# Patient Record
Sex: MALE | Race: BLACK OR AFRICAN AMERICAN | Employment: OTHER | URBAN - NONMETROPOLITAN AREA
[De-identification: names, ages, dates, MRNs, and addresses within clinical notes are randomized per-mention and may not be internally consistent; named-entity substitution may affect disease eponyms.]

---

## 2021-04-16 ENCOUNTER — TRANSCRIBE ORDER (OUTPATIENT)
Dept: REGISTRATION | Age: 61
End: 2021-04-16

## 2021-04-16 ENCOUNTER — HOSPITAL ENCOUNTER (OUTPATIENT)
Dept: LAB | Age: 61
Discharge: HOME | End: 2021-04-16
Payer: MEDICARE

## 2021-04-16 DIAGNOSIS — E11.8 TYPE II DIABETES MELLITUS WITH COMPLICATION (HCC): Primary | ICD-10-CM

## 2021-04-16 DIAGNOSIS — E11.8 TYPE II DIABETES MELLITUS WITH COMPLICATION (HCC): ICD-10-CM

## 2021-04-16 LAB
ALBUMIN SERPL-MCNC: 4.2 G/DL (ref 3.5–4.7)
ALBUMIN/GLOB SERPL: 1.2 {RATIO}
ALP SERPL-CCNC: 74 U/L (ref 38–126)
ALT SERPL-CCNC: 54 U/L (ref 3–72)
ANION GAP SERPL CALC-SCNC: 8 MMOL/L
AST SERPL W P-5'-P-CCNC: 29 U/L (ref 17–74)
BILIRUB SERPL-MCNC: 1 MG/DL (ref 0.2–1)
BUN SERPL-MCNC: 20 MG/DL (ref 9–21)
BUN/CREAT SERPL: 17
CA-I BLD-MCNC: 9.8 MG/DL (ref 8.5–10.5)
CHLORIDE SERPL-SCNC: 105 MMOL/L (ref 94–111)
CO2 SERPL-SCNC: 25 MMOL/L (ref 21–33)
CREAT SERPL-MCNC: 1.2 MG/DL (ref 0.8–1.5)
EST. AVERAGE GLUCOSE BLD GHB EST-MCNC: 163 MG/DL
GLOBULIN SER CALC-MCNC: 3.4 G/DL
GLUCOSE SERPL-MCNC: 128 MG/DL (ref 70–110)
HBA1C MFR BLD: 7.3 % (ref 4–5.6)
POTASSIUM SERPL-SCNC: 4.5 MMOL/L (ref 3.2–5.1)
PROT SERPL-MCNC: 7.6 G/DL (ref 6.1–8.4)
SODIUM SERPL-SCNC: 138 MMOL/L (ref 135–145)

## 2021-04-16 PROCEDURE — 83036 HEMOGLOBIN GLYCOSYLATED A1C: CPT

## 2021-04-16 PROCEDURE — 80053 COMPREHEN METABOLIC PANEL: CPT

## 2021-04-16 PROCEDURE — 36415 COLL VENOUS BLD VENIPUNCTURE: CPT

## 2022-03-08 ENCOUNTER — APPOINTMENT (OUTPATIENT)
Dept: CT IMAGING | Age: 62
End: 2022-03-08
Attending: EMERGENCY MEDICINE
Payer: MEDICARE

## 2022-03-08 ENCOUNTER — HOSPITAL ENCOUNTER (EMERGENCY)
Age: 62
Discharge: HOME | End: 2022-03-08
Attending: EMERGENCY MEDICINE
Payer: MEDICARE

## 2022-03-08 VITALS
WEIGHT: 160 LBS | RESPIRATION RATE: 14 BRPM | SYSTOLIC BLOOD PRESSURE: 145 MMHG | HEART RATE: 76 BPM | TEMPERATURE: 98.3 F | DIASTOLIC BLOOD PRESSURE: 78 MMHG | OXYGEN SATURATION: 98 % | BODY MASS INDEX: 25.71 KG/M2 | HEIGHT: 66 IN

## 2022-03-08 DIAGNOSIS — S01.01XA LACERATION OF SCALP, INITIAL ENCOUNTER: Primary | ICD-10-CM

## 2022-03-08 PROCEDURE — 70450 CT HEAD/BRAIN W/O DYE: CPT

## 2022-03-08 PROCEDURE — 90715 TDAP VACCINE 7 YRS/> IM: CPT | Performed by: EMERGENCY MEDICINE

## 2022-03-08 PROCEDURE — 99284 EMERGENCY DEPT VISIT MOD MDM: CPT

## 2022-03-08 PROCEDURE — 75810000293 HC SIMP/SUPERF WND  RPR

## 2022-03-08 PROCEDURE — 74011250636 HC RX REV CODE- 250/636: Performed by: EMERGENCY MEDICINE

## 2022-03-08 PROCEDURE — 90471 IMMUNIZATION ADMIN: CPT

## 2022-03-08 RX ADMIN — TETANUS TOXOID, REDUCED DIPHTHERIA TOXOID AND ACELLULAR PERTUSSIS VACCINE, ADSORBED 0.5 ML: 5; 2.5; 8; 8; 2.5 SUSPENSION INTRAMUSCULAR at 16:52

## 2022-03-08 NOTE — ED NOTES
Patients mom states that patient has been special needs since birth. He is non verbal with exception of yes/no.

## 2022-03-08 NOTE — ED TRIAGE NOTES
EMS received call for a pt who experienced a fall while in an adult day care setting. Pt. Did not want to come. PT. Is non verbal but per EMS seemed disgruntled to have to come. Worker from adult day care presents with pt. Until family can arrive.

## 2022-03-10 NOTE — ED PROVIDER NOTES
EMERGENCY DEPARTMENT HISTORY AND PHYSICAL EXAM      Date: 3/8/2022  Patient Name: Pedro Kimble    History of Presenting Illness     Chief Complaint   Patient presents with    Fall    Head Injury       History Provided By: Patient's Mother, EMS and Caregiver    HPI: Pedro Kimble, 59 y.o. male with a past medical history significant history of intellectual abilities, classified an old chart as MR, he has speech and multiple other impediments,  presents to the ED from a rehab and out day care place where he is reported to have fallen and injured his head. Mother at bedside however states patient had spasms of his back jerking and he hit the back of his head to the wall and fell. He has a cut back of head. There is no history of loss of consciousness. According to mother who can communicate with patient, patient is denying headache. Unable to get history from patient myself. There are no other complaints, changes, or physical findings at this time. PCP: COLTON Gr    No current facility-administered medications on file prior to encounter. No current outpatient medications on file prior to encounter. Past History     Past Medical History:  No past medical history on file. Past Surgical History:  No past surgical history on file. Family History:  No family history on file. Social History:  Social History     Tobacco Use    Smoking status: Not on file    Smokeless tobacco: Not on file   Substance Use Topics    Alcohol use: Not on file    Drug use: Not on file       Allergies:  No Known Allergies      Review of Systems     Review of Systems   Unable to perform ROS: Patient nonverbal       Physical Exam     Physical Exam  Vitals and nursing note reviewed. Constitutional:       General: He is not in acute distress. Appearance: He is well-developed. He is not diaphoretic. Comments: Appears in no acute distress. He is able to follow directions.    HENT:      Head: Normocephalic and atraumatic. No right periorbital erythema or left periorbital erythema. Jaw: No trismus. Comments: 4 cm laceration, superficial occipital area. Pain is controlled. Right Ear: External ear normal. No drainage or swelling. Tympanic membrane is not perforated, erythematous or bulging. Left Ear: External ear normal. No drainage or swelling. Tympanic membrane is not perforated, erythematous or bulging. Nose: Nose normal. No mucosal edema or rhinorrhea. Right Sinus: No maxillary sinus tenderness or frontal sinus tenderness. Left Sinus: No maxillary sinus tenderness or frontal sinus tenderness. Mouth/Throat:      Mouth: No oral lesions. Dentition: No dental abscesses. Pharynx: Uvula midline. No oropharyngeal exudate, posterior oropharyngeal erythema or uvula swelling. Tonsils: No tonsillar abscesses. Eyes:      General: No scleral icterus. Right eye: No discharge. Left eye: No discharge. Conjunctiva/sclera: Conjunctivae normal.   Cardiovascular:      Rate and Rhythm: Normal rate and regular rhythm. Heart sounds: Normal heart sounds. No murmur heard. No friction rub. No gallop. Pulmonary:      Effort: Pulmonary effort is normal. No tachypnea, accessory muscle usage or respiratory distress. Breath sounds: Normal breath sounds. No decreased breath sounds, wheezing, rhonchi or rales. Abdominal:      General: Bowel sounds are normal. There is no distension. Palpations: Abdomen is soft. Tenderness: There is no abdominal tenderness. Musculoskeletal:         General: No tenderness. Normal range of motion. Cervical back: Normal range of motion and neck supple. Lymphadenopathy:      Cervical: No cervical adenopathy. Skin:     General: Skin is warm and dry. Neurological:      Mental Status: He is alert and oriented to person, place, and time.    Psychiatric:         Judgment: Judgment normal. Lab and Diagnostic Study Results     Labs -   No results found for this or any previous visit (from the past 12 hour(s)). Radiologic Studies -   @lastxrresult@  CT Results  (Last 48 hours)               03/08/22 1450  CT HEAD WO CONT Final result    Impression:          1. No acute intra-axial or extra-axial hemorrhage. No evidence of mass effect or   midline shift. 2. Colpocephalic to the lateral ventricles with marked accompanying ventricular   dilatation and extreme focal atrophy of the parietal lobes. 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 Comparison with prior imaging is recommended if available.      > Suspect sequela of prior/distant toxic/metabolic insult. Narrative:  EXAM: CT head       INDICATION: Fall with head injury. COMPARISON: None. TECHNIQUE: Axial CT imaging of the head was performed without intravenous   contrast. Standard multiplanar coronal and sagittal reformatted images were   obtained and are included in interpretation. One or more dose reduction techniques were used on this CT: automated exposure   control, adjustment of the mAs and/or kVp according to patient size, and   iterative reconstruction techniques. The specific techniques used on this CT   exam have been documented in the patient's electronic medical record. Digital   Imaging and Communications in Medicine (DICOM) format image data are available   to nonaffiliated external healthcare facilities or entities on a secure, media   free, reciprocally searchable basis with patient authorization for at least a   12-month period after this study. _______________       FINDINGS:       BRAIN AND POSTERIOR FOSSA: Marked enlargement of the posterior horns of the   lateral ventricles noted bilaterally. Normal configuration to the anterior   components of the lateral ventricles, third ventricle, and fourth ventricle. No   intra-axial hemorrhage.  No evidence of mass effect or midline shift. Subcortical   and periventricular white matter low-attenuation noted surrounding the areas of   ventricular enlargement with accompanying faint intracranial calcifications. Marked atrophy of the parietal lobes. EXTRA-AXIAL SPACES AND MENINGES: There are no abnormal extra-axial fluid   collections. CALVARIUM: Intact. SINUSES: Clear. OTHER: None.       _______________               CXR Results  (Last 48 hours)    None            Medical Decision Making   - I am the first provider for this patient. - I reviewed the vital signs, available nursing notes, past medical history, past surgical history, family history and social history. - Initial assessment performed. The patients presenting problems have been discussed, and they are in agreement with the care plan formulated and outlined with them. I have encouraged them to ask questions as they arise throughout their visit. Vital Signs-Reviewed the patient's vital signs. No data found. Records Reviewed: Nursing Notes and Old Medical Records    The patient presents with fall with head injury. ED Course:          Provider Notes (Medical Decision Making):         Procedures   Medical Decision Makingedical Decision Making  Performed by: Amy Don MD  PROCEDURES:  Wound Repair    Date/Time: 3/8/2022 5:15 PM  Performed by: attendingPreparation: skin prepped with Shur-Clens and sterile field established  Location details: scalp  Wound length:2.6 - 7.5 cm  Irrigation solution: tap water  Irrigation method: tap  Debridement: none  Skin closure: staples  Number of sutures: 3  Approximation: close  Patient tolerance: patient tolerated the procedure well with no immediate complications  My total time at bedside, performing this procedure was 1-15 minutes.            Disposition   Disposition: Condition stable  DC-The patient and mother was given verbal closed head injury and wound care instructions    Diagnosis:   1. Laceration of scalp, initial encounter          Disposition:     Follow-up Information     Follow up With Specialties Details Why Contact Info    Alyce Greer [place] Physician Assistant  For suture removal [address] [place]  [phone]      [facility] EMERGENCY DEPT Emergency Medicine  For suture removal [address] [facility]  [phone]          There are no discharge medications for this patient. DISCHARGE PLAN:  1. Cannot display discharge medications since this patient is not currently admitted. 2.   Follow-up Information     Follow up With Specialties Details Why Contact Info    Alyce Greer [place] Physician Assistant  For suture removal [address] [place]  [phone]      [facility] EMERGENCY DEPT Emergency Medicine  For suture removal [place] [id number] [phone]        3. Return to ED if worse   4. There are no discharge medications for this patient. Diagnosis     Clinical Impression:   1. Laceration of scalp, initial encounter        Attestations:    Estuardo Hernadez MD    Please note that this dictation was completed with [employer], the [employer] voice recognition software. Quite often unanticipated grammatical, syntax, homophones, and other interpretive errors are inadvertently transcribed by the computer software. Please disregard these errors. Please excuse any errors that have escaped final proofreading. Thank you.

## 2023-04-19 ENCOUNTER — OFFICE VISIT (OUTPATIENT)
Facility: CLINIC | Age: 63
End: 2023-04-19
Payer: MEDICARE

## 2023-04-19 VITALS
OXYGEN SATURATION: 99 % | RESPIRATION RATE: 18 BRPM | HEART RATE: 76 BPM | HEIGHT: 66 IN | BODY MASS INDEX: 24.88 KG/M2 | SYSTOLIC BLOOD PRESSURE: 107 MMHG | DIASTOLIC BLOOD PRESSURE: 72 MMHG | TEMPERATURE: 97.5 F | WEIGHT: 154.8 LBS

## 2023-04-19 DIAGNOSIS — F79 MENTAL DISABILITY: ICD-10-CM

## 2023-04-19 DIAGNOSIS — M62.838 SPASM OF MUSCLE: ICD-10-CM

## 2023-04-19 DIAGNOSIS — Z12.5 ENCOUNTER FOR PROSTATE CANCER SCREENING: ICD-10-CM

## 2023-04-19 DIAGNOSIS — E11.65 TYPE 2 DIABETES MELLITUS WITH HYPERGLYCEMIA, WITHOUT LONG-TERM CURRENT USE OF INSULIN (HCC): Primary | ICD-10-CM

## 2023-04-19 DIAGNOSIS — H54.8 LEGALLY BLIND: ICD-10-CM

## 2023-04-19 LAB — HBA1C MFR BLD: 11.1 %

## 2023-04-19 PROCEDURE — G8427 DOCREV CUR MEDS BY ELIG CLIN: HCPCS | Performed by: STUDENT IN AN ORGANIZED HEALTH CARE EDUCATION/TRAINING PROGRAM

## 2023-04-19 PROCEDURE — 3017F COLORECTAL CA SCREEN DOC REV: CPT | Performed by: STUDENT IN AN ORGANIZED HEALTH CARE EDUCATION/TRAINING PROGRAM

## 2023-04-19 PROCEDURE — 3046F HEMOGLOBIN A1C LEVEL >9.0%: CPT | Performed by: STUDENT IN AN ORGANIZED HEALTH CARE EDUCATION/TRAINING PROGRAM

## 2023-04-19 PROCEDURE — 1036F TOBACCO NON-USER: CPT | Performed by: STUDENT IN AN ORGANIZED HEALTH CARE EDUCATION/TRAINING PROGRAM

## 2023-04-19 PROCEDURE — G8420 CALC BMI NORM PARAMETERS: HCPCS | Performed by: STUDENT IN AN ORGANIZED HEALTH CARE EDUCATION/TRAINING PROGRAM

## 2023-04-19 PROCEDURE — 2022F DILAT RTA XM EVC RTNOPTHY: CPT | Performed by: STUDENT IN AN ORGANIZED HEALTH CARE EDUCATION/TRAINING PROGRAM

## 2023-04-19 PROCEDURE — 83036 HEMOGLOBIN GLYCOSYLATED A1C: CPT | Performed by: STUDENT IN AN ORGANIZED HEALTH CARE EDUCATION/TRAINING PROGRAM

## 2023-04-19 PROCEDURE — 99204 OFFICE O/P NEW MOD 45 MIN: CPT | Performed by: STUDENT IN AN ORGANIZED HEALTH CARE EDUCATION/TRAINING PROGRAM

## 2023-04-19 RX ORDER — ATORVASTATIN CALCIUM 20 MG/1
20 TABLET, FILM COATED ORAL NIGHTLY
COMMUNITY
Start: 2023-03-28

## 2023-04-19 RX ORDER — ASPIRIN 81 MG/1
81 TABLET ORAL DAILY
COMMUNITY

## 2023-04-19 RX ORDER — GLIPIZIDE 10 MG/1
10 TABLET, FILM COATED, EXTENDED RELEASE ORAL DAILY
Qty: 90 TABLET | Refills: 1 | Status: SHIPPED | OUTPATIENT
Start: 2023-04-19

## 2023-04-19 RX ORDER — GLIPIZIDE 5 MG/1
5 TABLET, FILM COATED, EXTENDED RELEASE ORAL DAILY
COMMUNITY
Start: 2023-03-28 | End: 2023-04-19 | Stop reason: SDUPTHER

## 2023-04-19 RX ORDER — TIZANIDINE 2 MG/1
TABLET ORAL
COMMUNITY
Start: 2023-03-22

## 2023-04-19 RX ORDER — BLOOD SUGAR DIAGNOSTIC
STRIP MISCELLANEOUS
COMMUNITY
Start: 2023-03-22

## 2023-04-19 SDOH — ECONOMIC STABILITY: HOUSING INSECURITY
IN THE LAST 12 MONTHS, WAS THERE A TIME WHEN YOU DID NOT HAVE A STEADY PLACE TO SLEEP OR SLEPT IN A SHELTER (INCLUDING NOW)?: NO

## 2023-04-19 SDOH — ECONOMIC STABILITY: FOOD INSECURITY: WITHIN THE PAST 12 MONTHS, YOU WORRIED THAT YOUR FOOD WOULD RUN OUT BEFORE YOU GOT MONEY TO BUY MORE.: NEVER TRUE

## 2023-04-19 SDOH — ECONOMIC STABILITY: INCOME INSECURITY: HOW HARD IS IT FOR YOU TO PAY FOR THE VERY BASICS LIKE FOOD, HOUSING, MEDICAL CARE, AND HEATING?: SOMEWHAT HARD

## 2023-04-19 SDOH — ECONOMIC STABILITY: FOOD INSECURITY: WITHIN THE PAST 12 MONTHS, THE FOOD YOU BOUGHT JUST DIDN'T LAST AND YOU DIDN'T HAVE MONEY TO GET MORE.: SOMETIMES TRUE

## 2023-04-19 ASSESSMENT — PATIENT HEALTH QUESTIONNAIRE - PHQ9: DEPRESSION UNABLE TO ASSESS: FUNCTIONAL CAPACITY MOTIVATION LIMITS ACCURACY

## 2023-04-19 NOTE — PROGRESS NOTES
Ayla Lewis presents today for   Chief Complaint   Patient presents with    New Patient     Here to establish care       Is someone accompanying this pt? yes    Is the patient using any DME equipment during OV? no    Health Maintenance reviewed and discussed and ordered per Provider. Health Maintenance Due   Topic Date Due    Lipids  Never done    Depression Screen  Never done    HIV screen  Never done    Hepatitis C screen  Never done    Colorectal Cancer Screen  Never done    Shingles vaccine (1 of 2) Never done    A1C test (Diabetic or Prediabetic)  07/16/2021    Annual Wellness Visit (AWV)  Never done    COVID-19 Vaccine (3 - Booster for [employer] series) 09/02/2022   . Coordination of Care:  1. \"Have you been to the ER, urgent care clinic since your last visit? Hospitalized since your last visit? \" No    2. \"Have you seen or consulted any other health care providers outside of the [address] [place] [address] since your last visit? \" No    3. For patients aged 39-70: Has the patient had a colonoscopy? No- maybe 5 years ago, doesn't remember when, where or who    If the patient is female:    4. For patients aged 41-77: Has the patient had a mammogram within the past 2 years? N/A based on age/sex    5. For patients aged 21-65: Has the patient had a pap smear? N/A based on age/sex    Fingerstick for HBA1C done in left hand first finger by Isaias Lora LPN per order of Surekha Ortiz MD after cleaning area with alcohol wipe. Patient tolerated procedure well.

## 2023-04-19 NOTE — PROGRESS NOTES
NEW PATIENT    History of Present Illness    Chief Complaint   Patient presents with    New Patient     Here to establish care       Ayla Lewis is a 57 y.o. male who presents today for establishment of care. Histories reviewed in detail and are outlined below. Patient has intellectual disability. Mother is primary caretaker, she provides most history. Patient is able to answer basic yes/no questions. Diabetes  Hemoglobin A1C   Date Value Ref Range Status   04/16/2021 7.3 (H) 4.0 - 5.6 % Final     Comment:     NEW METHOD  PLEASE NOTE NEW REFERENCE RANGE  (NOTE)  HbA1C Interpretive Ranges  <5.7              Normal  5.7 - 6.4         Consider Prediabetes  >6.5              Consider Diabetes       Hemoglobin A1C, POC   Date Value Ref Range Status   04/19/2023 46.8 % Final   Complications: None known  Home fasting blood sugars: variable- 200-400  No hypoglycemia or hypoglycemic sxs  Compliant with medications and tolerating well. Weight is stable. Diet is low carb, low sugar. Minimal exercise. Yearly ophthalmology visit: yes - Dr. Gavi Nguyen- patient is legally blind since childhood. Sees podiatry: no- Checks feet regularly, no open wounds or issues. On statin and tolerating well: Yes  ROS:  No changes in vision, chest pain, no SOB, no leg swelling, no polyuria, no numbness/tingling or sensation change    Takes Tizanidine PRN for muscle spasms in back    Never had crc screening    Past Medical History  Past Medical History:   Diagnosis Date    Type 2 diabetes mellitus without complication ([facility] [address]         Surgical History  History reviewed. No pertinent surgical history.      Current Medications  Current Outpatient Medications   Medication Sig    atorvastatin (LIPITOR) 20 MG tablet Take 1 tablet by mouth nightly    ONETOUCH ULTRA strip USE 1 STRIP TO CHECK GLUCOSE ONCE DAILY    tiZANidine (ZANAFLEX) 2 MG tablet TAKE 2 TABLETS BY MOUTH TWICE DAILY AS NEEDED    aspirin 81 MG EC tablet Take 1 tablet by mouth daily

## 2023-05-25 ENCOUNTER — HOSPITAL ENCOUNTER (OUTPATIENT)
Age: 63
Discharge: HOME | End: 2023-05-25
Payer: MEDICARE

## 2023-05-25 DIAGNOSIS — Z12.5 ENCOUNTER FOR PROSTATE CANCER SCREENING: ICD-10-CM

## 2023-05-25 DIAGNOSIS — E11.65 TYPE 2 DIABETES MELLITUS WITH HYPERGLYCEMIA, WITHOUT LONG-TERM CURRENT USE OF INSULIN (HCC): ICD-10-CM

## 2023-05-25 LAB
ALBUMIN SERPL-MCNC: 3.8 G/DL (ref 3.4–5)
ALBUMIN/GLOB SERPL: 1 (ref 0.8–1.7)
ALP SERPL-CCNC: 77 U/L (ref 45–117)
ALT SERPL-CCNC: 36 U/L (ref 16–61)
ANION GAP SERPL CALC-SCNC: 4 MMOL/L (ref 3–18)
AST SERPL W P-5'-P-CCNC: 20 U/L (ref 10–38)
BASOPHILS # BLD: 0.1 K/UL (ref 0–0.1)
BASOPHILS NFR BLD: 1 % (ref 0–2)
BILIRUB SERPL-MCNC: 0.5 MG/DL (ref 0.2–1)
BUN SERPL-MCNC: 14 MG/DL (ref 7–18)
BUN/CREAT SERPL: 12 (ref 12–20)
CA-I BLD-MCNC: 10 MG/DL (ref 8.5–10.1)
CHLORIDE SERPL-SCNC: 106 MMOL/L (ref 100–111)
CO2 SERPL-SCNC: 27 MMOL/L (ref 21–32)
CREAT SERPL-MCNC: 1.13 MG/DL (ref 0.6–1.3)
DIFFERENTIAL METHOD BLD: ABNORMAL
EOSINOPHIL # BLD: 0.3 K/UL (ref 0–0.4)
EOSINOPHIL NFR BLD: 6 % (ref 0–5)
ERYTHROCYTE [DISTWIDTH] IN BLOOD BY AUTOMATED COUNT: 13.2 % (ref 11.6–14.5)
GLOBULIN SER CALC-MCNC: 3.7 G/DL (ref 2–4)
GLUCOSE SERPL-MCNC: 216 MG/DL (ref 74–99)
HCT VFR BLD AUTO: 40.2 % (ref 36–48)
HGB BLD-MCNC: 12.8 G/DL (ref 13–16)
IMM GRANULOCYTES # BLD AUTO: 0 K/UL (ref 0–0.04)
IMM GRANULOCYTES NFR BLD AUTO: 0 % (ref 0–0.5)
LYMPHOCYTES # BLD: 2.2 K/UL (ref 0.9–3.6)
LYMPHOCYTES NFR BLD: 41 % (ref 21–52)
MCH RBC QN AUTO: 27.2 PG (ref 24–34)
MCHC RBC AUTO-ENTMCNC: 31.8 G/DL (ref 31–37)
MCV RBC AUTO: 85.5 FL (ref 78–100)
MONOCYTES # BLD: 0.6 K/UL (ref 0.05–1.2)
MONOCYTES NFR BLD: 11 % (ref 3–10)
NEUTS SEG # BLD: 2.2 K/UL (ref 1.8–8)
NEUTS SEG NFR BLD: 41 % (ref 40–73)
NRBC # BLD: 0 K/UL (ref 0–0.01)
NRBC BLD-RTO: 0 PER 100 WBC
PLATELET # BLD AUTO: 205 K/UL (ref 135–420)
PMV BLD AUTO: 10.8 FL (ref 9.2–11.8)
POTASSIUM SERPL-SCNC: 4.4 MMOL/L (ref 3.5–5.5)
PROT SERPL-MCNC: 7.5 G/DL (ref 6.4–8.2)
RBC # BLD AUTO: 4.7 M/UL (ref 4.35–5.65)
SODIUM SERPL-SCNC: 137 MMOL/L (ref 136–145)
WBC # BLD AUTO: 5.3 K/UL (ref 4.6–13.2)

## 2023-05-25 PROCEDURE — 86803 HEPATITIS C AB TEST: CPT

## 2023-05-25 PROCEDURE — 87389 HIV-1 AG W/HIV-1&-2 AB AG IA: CPT

## 2023-05-25 PROCEDURE — 80061 LIPID PANEL: CPT

## 2023-05-25 PROCEDURE — 84153 ASSAY OF PSA TOTAL: CPT

## 2023-05-25 PROCEDURE — 36415 COLL VENOUS BLD VENIPUNCTURE: CPT

## 2023-05-25 PROCEDURE — 85025 COMPLETE CBC W/AUTO DIFF WBC: CPT

## 2023-05-25 PROCEDURE — 80053 COMPREHEN METABOLIC PANEL: CPT

## 2023-05-26 LAB
CHOLEST SERPL-MCNC: 77 MG/DL
HCV AB SER IA-ACNC: 0.16 INDEX
HCV AB SERPL QL IA: NEGATIVE
HDLC SERPL-MCNC: 44 MG/DL (ref 40–60)
HDLC SERPL: 1.8 (ref 0–5)
HIV 1+2 AB+HIV1 P24 AG SERPL QL IA: NONREACTIVE
HIV 1/2 RESULT COMMENT: NORMAL
LDLC SERPL CALC-MCNC: 23.2 MG/DL (ref 0–100)
LIPID PANEL: NORMAL
Lab: NORMAL
PSA SERPL-MCNC: 0.9 NG/ML (ref 0–4)
TRIGL SERPL-MCNC: 49 MG/DL
VLDLC SERPL CALC-MCNC: 9.8 MG/DL

## 2023-08-18 ENCOUNTER — OFFICE VISIT (OUTPATIENT)
Facility: CLINIC | Age: 63
End: 2023-08-18
Payer: MEDICAID

## 2023-08-18 VITALS
HEIGHT: 66 IN | RESPIRATION RATE: 18 BRPM | BODY MASS INDEX: 25.71 KG/M2 | SYSTOLIC BLOOD PRESSURE: 112 MMHG | DIASTOLIC BLOOD PRESSURE: 74 MMHG | WEIGHT: 160 LBS | HEART RATE: 93 BPM | OXYGEN SATURATION: 97 % | TEMPERATURE: 97.4 F

## 2023-08-18 DIAGNOSIS — E11.65 TYPE 2 DIABETES MELLITUS WITH HYPERGLYCEMIA, WITHOUT LONG-TERM CURRENT USE OF INSULIN (HCC): Primary | ICD-10-CM

## 2023-08-18 DIAGNOSIS — E78.2 MIXED HYPERLIPIDEMIA: ICD-10-CM

## 2023-08-18 LAB — HBA1C MFR BLD: 8.3 %

## 2023-08-18 PROCEDURE — 1123F ACP DISCUSS/DSCN MKR DOCD: CPT | Performed by: STUDENT IN AN ORGANIZED HEALTH CARE EDUCATION/TRAINING PROGRAM

## 2023-08-18 PROCEDURE — 83036 HEMOGLOBIN GLYCOSYLATED A1C: CPT | Performed by: STUDENT IN AN ORGANIZED HEALTH CARE EDUCATION/TRAINING PROGRAM

## 2023-08-18 PROCEDURE — 99214 OFFICE O/P EST MOD 30 MIN: CPT | Performed by: STUDENT IN AN ORGANIZED HEALTH CARE EDUCATION/TRAINING PROGRAM

## 2023-08-18 ASSESSMENT — PATIENT HEALTH QUESTIONNAIRE - PHQ9: DEPRESSION UNABLE TO ASSESS: FUNCTIONAL CAPACITY MOTIVATION LIMITS ACCURACY

## 2023-08-18 NOTE — PROGRESS NOTES
Migue Tellez presents today for   Chief Complaint   Patient presents with    Diabetes     3m diabetes follow up       Is someone accompanying this pt? yes    Is the patient using any DME equipment during OV? no    Health Maintenance reviewed and discussed and ordered per Provider. Health Maintenance Due   Topic Date Due    Pneumococcal 65+ years Vaccine (1 - PCV) Never done    Diabetic foot exam  Never done    Depression Screen  Never done    Diabetic Alb to Cr ratio (uACR) test  Never done    Diabetic retinal exam  Never done    Colorectal Cancer Screen  Never done    Shingles vaccine (1 of 2) Never done    Annual Wellness Visit (AWV)  Never done    COVID-19 Vaccine (3 - Booster for [employer] series) 09/02/2022    Flu vaccine (1) 08/01/2023    A1C test (Diabetic or Prediabetic)  07/19/2023   . Coordination of Care:  1. \"Have you been to the ER, urgent care clinic since your last visit? Hospitalized since your last visit? \" No    2. \"Have you seen or consulted any other health care providers outside of the [address] [place] [address] since your last visit? \" No    3. For patients aged 43-73: Has the patient had a colonoscopy? No    If the patient is female:    4. For patients aged 43-66: Has the patient had a mammogram within the past 2 years? N/A based on age/sex    5. For patients aged 21-65: Has the patient had a pap smear? N/A based on age/sex    Fingerstick for HBA1C done in left hand first finger by David Pearl LPN per order of Pati Almeida MD after cleaning area with alcohol wipe. Patient tolerated procedure well.

## 2023-08-18 NOTE — PROGRESS NOTES
Chronic Disease Follow up    Sundar Hogan (: 1960) is a 70 y.o. male here for evaluation of the following chief concerns(s):  Diabetes (3m diabetes follow up)       ASSESSMENT/PLAN:  1. Type 2 diabetes mellitus with hyperglycemia, without long-term current use of insulin (HCC)  -     AMB POC HEMOGLOBIN A1C  -     COLLECTION CAPILLARY BLOOD SPECIMEN  -     empagliflozin (JARDIANCE) 25 MG tablet; Take 1 tablet by mouth daily, Disp-90 tablet, R-1Normal  2. Mixed hyperlipidemia    Orders Placed This Encounter   Procedures    COLLECTION CAPILLARY BLOOD SPECIMEN    AMB POC HEMOGLOBIN A1C     Diabetes- significant improvement with current regimen, however still uncontrolled. Will add Jardiance- discussed side effect profile with patient. Continue Glipizide and Metformin. HLD- continue Lipitor, recheck cholesterol      HM- discuss CRC screening at follow up    Reviewed labs from 2023 which are overall stable. Return in about 3 months (around 2023) for diabetes follow up. Patient agrees with plan as above and has no additional questions at this time. SUBJECTIVE/OBJECTIVE:    Patient presents for follow up chronic disease     Patient has intellectual disability. Mother is primary caretaker, she provides most history. Patient is able to answer basic yes/no questions. Diabetes  Hemoglobin A1C   Date Value Ref Range Status   2021 7.3 (H) 4.0 - 5.6 % Final     Comment:     NEW METHOD  PLEASE NOTE NEW REFERENCE RANGE  (NOTE)  HbA1C Interpretive Ranges  <5.7              Normal  5.7 - 6.4         Consider Prediabetes  >6.5              Consider Diabetes       Hemoglobin A1C, POC   Date Value Ref Range Status   2023 8.3 % Final   Complications: None known  Home fasting blood sugars: variable-   No hypoglycemia or hypoglycemic sxs  Compliant with medications and tolerating well. Yearly ophthalmology visit: yes - Dr. Reji Medeiros- patient is legally blind since childhood.    Sees

## 2023-09-28 DIAGNOSIS — E11.65 TYPE 2 DIABETES MELLITUS WITH HYPERGLYCEMIA, WITHOUT LONG-TERM CURRENT USE OF INSULIN (HCC): ICD-10-CM

## 2023-12-02 DIAGNOSIS — E11.65 TYPE 2 DIABETES MELLITUS WITH HYPERGLYCEMIA, WITHOUT LONG-TERM CURRENT USE OF INSULIN (HCC): ICD-10-CM

## 2023-12-04 RX ORDER — GLIPIZIDE 10 MG/1
10 TABLET, FILM COATED, EXTENDED RELEASE ORAL DAILY
Qty: 90 TABLET | Refills: 0 | Status: SHIPPED | OUTPATIENT
Start: 2023-12-04

## 2024-01-27 DIAGNOSIS — E11.65 TYPE 2 DIABETES MELLITUS WITH HYPERGLYCEMIA, WITHOUT LONG-TERM CURRENT USE OF INSULIN (HCC): ICD-10-CM

## 2024-01-29 RX ORDER — EMPAGLIFLOZIN 25 MG/1
25 TABLET, FILM COATED ORAL DAILY
Qty: 90 TABLET | Refills: 0 | Status: SHIPPED | OUTPATIENT
Start: 2024-01-29

## 2024-02-24 DIAGNOSIS — E11.65 TYPE 2 DIABETES MELLITUS WITH HYPERGLYCEMIA, WITHOUT LONG-TERM CURRENT USE OF INSULIN (HCC): ICD-10-CM

## 2024-02-26 RX ORDER — GLIPIZIDE 10 MG/1
10 TABLET, FILM COATED, EXTENDED RELEASE ORAL DAILY
Qty: 90 TABLET | Refills: 0 | Status: SHIPPED | OUTPATIENT
Start: 2024-02-26

## 2024-05-03 DIAGNOSIS — E11.65 TYPE 2 DIABETES MELLITUS WITH HYPERGLYCEMIA, WITHOUT LONG-TERM CURRENT USE OF INSULIN (HCC): ICD-10-CM

## 2024-05-16 DIAGNOSIS — E11.65 TYPE 2 DIABETES MELLITUS WITH HYPERGLYCEMIA, WITHOUT LONG-TERM CURRENT USE OF INSULIN (HCC): ICD-10-CM

## 2024-05-16 RX ORDER — EMPAGLIFLOZIN 25 MG/1
25 TABLET, FILM COATED ORAL DAILY
Qty: 90 TABLET | Refills: 0 | Status: SHIPPED | OUTPATIENT
Start: 2024-05-16

## 2024-05-22 ENCOUNTER — OFFICE VISIT (OUTPATIENT)
Facility: CLINIC | Age: 64
End: 2024-05-22
Payer: MEDICARE

## 2024-05-22 ENCOUNTER — HOSPITAL ENCOUNTER (OUTPATIENT)
Age: 64
Discharge: HOME | End: 2024-05-25
Payer: MEDICARE

## 2024-05-22 VITALS
BODY MASS INDEX: 23.63 KG/M2 | RESPIRATION RATE: 18 BRPM | WEIGHT: 147 LBS | TEMPERATURE: 98.2 F | SYSTOLIC BLOOD PRESSURE: 102 MMHG | OXYGEN SATURATION: 97 % | HEIGHT: 66 IN | DIASTOLIC BLOOD PRESSURE: 64 MMHG | HEART RATE: 88 BPM

## 2024-05-22 DIAGNOSIS — E11.65 TYPE 2 DIABETES MELLITUS WITH HYPERGLYCEMIA, WITHOUT LONG-TERM CURRENT USE OF INSULIN (HCC): Primary | ICD-10-CM

## 2024-05-22 DIAGNOSIS — Z12.5 ENCOUNTER FOR PROSTATE CANCER SCREENING: ICD-10-CM

## 2024-05-22 DIAGNOSIS — E11.65 TYPE 2 DIABETES MELLITUS WITH HYPERGLYCEMIA, WITHOUT LONG-TERM CURRENT USE OF INSULIN (HCC): ICD-10-CM

## 2024-05-22 DIAGNOSIS — Z12.11 COLON CANCER SCREENING: ICD-10-CM

## 2024-05-22 LAB
ALBUMIN SERPL-MCNC: 3.6 G/DL (ref 3.4–5)
ALBUMIN/GLOB SERPL: 1.1 (ref 0.8–1.7)
ALP SERPL-CCNC: 57 U/L (ref 45–117)
ALT SERPL-CCNC: 34 U/L (ref 16–61)
ANION GAP SERPL CALC-SCNC: 7 MMOL/L (ref 3–18)
AST SERPL W P-5'-P-CCNC: 22 U/L (ref 10–38)
BASOPHILS # BLD: 0.1 K/UL (ref 0–0.1)
BASOPHILS NFR BLD: 2 % (ref 0–2)
BILIRUB SERPL-MCNC: 0.5 MG/DL (ref 0.2–1)
BUN SERPL-MCNC: 18 MG/DL (ref 7–18)
BUN/CREAT SERPL: 17 (ref 12–20)
CA-I BLD-MCNC: 10.3 MG/DL (ref 8.5–10.1)
CHLORIDE SERPL-SCNC: 110 MMOL/L (ref 100–111)
CO2 SERPL-SCNC: 26 MMOL/L (ref 21–32)
CREAT SERPL-MCNC: 1.07 MG/DL (ref 0.6–1.3)
DIFFERENTIAL METHOD BLD: ABNORMAL
EOSINOPHIL # BLD: 0.3 K/UL (ref 0–0.4)
EOSINOPHIL NFR BLD: 6 % (ref 0–5)
ERYTHROCYTE [DISTWIDTH] IN BLOOD BY AUTOMATED COUNT: 14.1 % (ref 11.6–14.5)
GLOBULIN SER CALC-MCNC: 3.4 G/DL (ref 2–4)
GLUCOSE SERPL-MCNC: 63 MG/DL (ref 74–99)
HBA1C MFR BLD: 5 %
HCT VFR BLD AUTO: 39.9 % (ref 36–48)
HGB BLD-MCNC: 12.9 G/DL (ref 13–16)
IMM GRANULOCYTES # BLD AUTO: 0 K/UL (ref 0–0.04)
IMM GRANULOCYTES NFR BLD AUTO: 0 % (ref 0–0.5)
LYMPHOCYTES # BLD: 2 K/UL (ref 0.9–3.6)
LYMPHOCYTES NFR BLD: 40 % (ref 21–52)
MCH RBC QN AUTO: 27.6 PG (ref 24–34)
MCHC RBC AUTO-ENTMCNC: 32.3 G/DL (ref 31–37)
MCV RBC AUTO: 85.3 FL (ref 78–100)
MONOCYTES # BLD: 0.6 K/UL (ref 0.05–1.2)
MONOCYTES NFR BLD: 11 % (ref 3–10)
NEUTS SEG # BLD: 2.1 K/UL (ref 1.8–8)
NEUTS SEG NFR BLD: 41 % (ref 40–73)
NRBC # BLD: 0 K/UL (ref 0–0.01)
NRBC BLD-RTO: 0 PER 100 WBC
PLATELET # BLD AUTO: 218 K/UL (ref 135–420)
PMV BLD AUTO: 10.6 FL (ref 9.2–11.8)
POTASSIUM SERPL-SCNC: 4.5 MMOL/L (ref 3.5–5.5)
PROT SERPL-MCNC: 7 G/DL (ref 6.4–8.2)
RBC # BLD AUTO: 4.68 M/UL (ref 4.35–5.65)
SODIUM SERPL-SCNC: 143 MMOL/L (ref 136–145)
WBC # BLD AUTO: 5 K/UL (ref 4.6–13.2)

## 2024-05-22 PROCEDURE — 2022F DILAT RTA XM EVC RTNOPTHY: CPT | Performed by: STUDENT IN AN ORGANIZED HEALTH CARE EDUCATION/TRAINING PROGRAM

## 2024-05-22 PROCEDURE — 1123F ACP DISCUSS/DSCN MKR DOCD: CPT | Performed by: STUDENT IN AN ORGANIZED HEALTH CARE EDUCATION/TRAINING PROGRAM

## 2024-05-22 PROCEDURE — 85025 COMPLETE CBC W/AUTO DIFF WBC: CPT

## 2024-05-22 PROCEDURE — 83036 HEMOGLOBIN GLYCOSYLATED A1C: CPT | Performed by: STUDENT IN AN ORGANIZED HEALTH CARE EDUCATION/TRAINING PROGRAM

## 2024-05-22 PROCEDURE — 80061 LIPID PANEL: CPT

## 2024-05-22 PROCEDURE — G0103 PSA SCREENING: HCPCS

## 2024-05-22 PROCEDURE — 3046F HEMOGLOBIN A1C LEVEL >9.0%: CPT | Performed by: STUDENT IN AN ORGANIZED HEALTH CARE EDUCATION/TRAINING PROGRAM

## 2024-05-22 PROCEDURE — 36415 COLL VENOUS BLD VENIPUNCTURE: CPT

## 2024-05-22 PROCEDURE — G8420 CALC BMI NORM PARAMETERS: HCPCS | Performed by: STUDENT IN AN ORGANIZED HEALTH CARE EDUCATION/TRAINING PROGRAM

## 2024-05-22 PROCEDURE — 3017F COLORECTAL CA SCREEN DOC REV: CPT | Performed by: STUDENT IN AN ORGANIZED HEALTH CARE EDUCATION/TRAINING PROGRAM

## 2024-05-22 PROCEDURE — 99214 OFFICE O/P EST MOD 30 MIN: CPT | Performed by: STUDENT IN AN ORGANIZED HEALTH CARE EDUCATION/TRAINING PROGRAM

## 2024-05-22 PROCEDURE — G8428 CUR MEDS NOT DOCUMENT: HCPCS | Performed by: STUDENT IN AN ORGANIZED HEALTH CARE EDUCATION/TRAINING PROGRAM

## 2024-05-22 PROCEDURE — 80053 COMPREHEN METABOLIC PANEL: CPT

## 2024-05-22 PROCEDURE — 1036F TOBACCO NON-USER: CPT | Performed by: STUDENT IN AN ORGANIZED HEALTH CARE EDUCATION/TRAINING PROGRAM

## 2024-05-22 SDOH — ECONOMIC STABILITY: FOOD INSECURITY: WITHIN THE PAST 12 MONTHS, YOU WORRIED THAT YOUR FOOD WOULD RUN OUT BEFORE YOU GOT MONEY TO BUY MORE.: PATIENT DECLINED

## 2024-05-22 SDOH — ECONOMIC STABILITY: FOOD INSECURITY: WITHIN THE PAST 12 MONTHS, THE FOOD YOU BOUGHT JUST DIDN'T LAST AND YOU DIDN'T HAVE MONEY TO GET MORE.: PATIENT DECLINED

## 2024-05-22 SDOH — ECONOMIC STABILITY: HOUSING INSECURITY
IN THE LAST 12 MONTHS, WAS THERE A TIME WHEN YOU DID NOT HAVE A STEADY PLACE TO SLEEP OR SLEPT IN A SHELTER (INCLUDING NOW)?: PATIENT DECLINED

## 2024-05-22 SDOH — ECONOMIC STABILITY: INCOME INSECURITY: HOW HARD IS IT FOR YOU TO PAY FOR THE VERY BASICS LIKE FOOD, HOUSING, MEDICAL CARE, AND HEATING?: PATIENT DECLINED

## 2024-05-22 ASSESSMENT — PATIENT HEALTH QUESTIONNAIRE - PHQ9
1. LITTLE INTEREST OR PLEASURE IN DOING THINGS: NOT AT ALL
SUM OF ALL RESPONSES TO PHQ QUESTIONS 1-9: 0
SUM OF ALL RESPONSES TO PHQ QUESTIONS 1-9: 0
SUM OF ALL RESPONSES TO PHQ9 QUESTIONS 1 & 2: 0
2. FEELING DOWN, DEPRESSED OR HOPELESS: NOT AT ALL
SUM OF ALL RESPONSES TO PHQ QUESTIONS 1-9: 0
SUM OF ALL RESPONSES TO PHQ QUESTIONS 1-9: 0

## 2024-05-22 NOTE — PROGRESS NOTES
Chronic Disease Follow up    Ashley Molina (: 1960) is a 63 y.o. male here for evaluation of the following chief concerns(s):  Diabetes (9m diabetes follow up )       ASSESSMENT/PLAN:  1. Type 2 diabetes mellitus with hyperglycemia, without long-term current use of insulin (HCC)  -     AMB POC HEMOGLOBIN A1C  -     COLLECTION CAPILLARY BLOOD SPECIMEN  -     Lipid Panel; Future  -     Comprehensive Metabolic Panel; Future  -     CBC with Auto Differential; Future  -     Urinalysis with Microscopic; Future  -     Microalbumin / Creatinine Urine Ratio; Future  2. Encounter for prostate cancer screening  -     PSA Screening; Future  3. Colon cancer screening  -     Cologuard (Fecal DNA Colorectal Cancer Screening)    Orders Placed This Encounter   Procedures    COLLECTION CAPILLARY BLOOD SPECIMEN    Cologuard (Fecal DNA Colorectal Cancer Screening)    PSA Screening     Standing Status:   Future     Number of Occurrences:   1     Standing Expiration Date:   2025    Lipid Panel     Standing Status:   Future     Number of Occurrences:   1     Standing Expiration Date:   2025    Comprehensive Metabolic Panel     Standing Status:   Future     Number of Occurrences:   1     Standing Expiration Date:   2025    CBC with Auto Differential     Standing Status:   Future     Number of Occurrences:   1     Standing Expiration Date:   2025    Urinalysis with Microscopic     Standing Status:   Future     Number of Occurrences:   1     Standing Expiration Date:   2025    Microalbumin / Creatinine Urine Ratio     Standing Status:   Future     Number of Occurrences:   1     Standing Expiration Date:   2025    AMB POC HEMOGLOBIN A1C     Diabetes- A1c down from 11 to 5. DC Glipizide. Continue Metformin and Jardiance. Hypoglycemic precautions.    HLD- continue Lipitor, recheck cholesterol      HM- cologuard for CRC screening    FU 3 months for repeat A1c    Return in about 3 months (around 2024)

## 2024-05-22 NOTE — PROGRESS NOTES
Chief Complaint   Patient presents with    Diabetes     9m diabetes follow up        Is someone accompanying this pt? yes    Is the patient using any DME equipment during OV? no    Health Maintenance Due   Topic Date Due    Pneumococcal 65+ years Vaccine (1 of 2 - PCV) Never done    Diabetic foot exam  Never done    Depression Screen  Never done    Diabetic Alb to Cr ratio (uACR) test  Never done    Diabetic retinal exam  Never done    Shingles vaccine (1 of 2) Never done    Respiratory Syncytial Virus (RSV) Pregnant or age 60 yrs+ (1 - 1-dose 60+ series) Never done    Colorectal Cancer Screen  10/14/2021    COVID-19 Vaccine (3 - 2023-24 season) 09/01/2023    Annual Wellness Visit (Medicare Advantage)  Never done    Lipids  05/25/2024    GFR test (Diabetes, CKD 3-4, OR last GFR 15-59)  05/25/2024       \"Have you been to the ER, urgent care clinic since your last visit?  Hospitalized since your last visit?\"    NO    “Have you seen or consulted any other health care providers outside of [facility] since your last visit?”    NO    “Have you had a colorectal cancer screening such as a colonoscopy/FIT/Cologuard?    NO    Date of last Colonoscopy: 10/14/2016  No cologuard on file  No FIT/FOBT on file   No flexible sigmoidoscopy on file             Fingerstick for HBA1C done in right hand first finger by Beth Castillo LPN per order of Elissa Hawley MD after cleaning area with alcohol wipe.  Patient tolerated procedure well.

## 2024-05-23 LAB
CHOLEST SERPL-MCNC: 83 MG/DL
HDLC SERPL-MCNC: 44 MG/DL (ref 40–60)
HDLC SERPL: 1.9 (ref 0–5)
LDLC SERPL CALC-MCNC: 30.8 MG/DL (ref 0–100)
LIPID PANEL: NORMAL
PSA SERPL-MCNC: 0.9 NG/ML (ref 0–4)
TRIGL SERPL-MCNC: 41 MG/DL
VLDLC SERPL CALC-MCNC: 8.2 MG/DL

## 2024-06-04 DIAGNOSIS — E11.65 TYPE 2 DIABETES MELLITUS WITH HYPERGLYCEMIA, WITHOUT LONG-TERM CURRENT USE OF INSULIN (HCC): ICD-10-CM

## 2024-08-05 DIAGNOSIS — E11.65 TYPE 2 DIABETES MELLITUS WITH HYPERGLYCEMIA, WITHOUT LONG-TERM CURRENT USE OF INSULIN (HCC): ICD-10-CM

## 2024-08-05 RX ORDER — EMPAGLIFLOZIN 25 MG/1
25 TABLET, FILM COATED ORAL DAILY
Qty: 90 TABLET | Refills: 0 | Status: SHIPPED | OUTPATIENT
Start: 2024-08-05

## 2024-08-22 DIAGNOSIS — E11.65 TYPE 2 DIABETES MELLITUS WITH HYPERGLYCEMIA, WITHOUT LONG-TERM CURRENT USE OF INSULIN (HCC): ICD-10-CM

## 2024-08-22 RX ORDER — EMPAGLIFLOZIN 25 MG/1
25 TABLET, FILM COATED ORAL DAILY
Qty: 90 TABLET | Refills: 1 | Status: SHIPPED | OUTPATIENT
Start: 2024-08-22

## 2024-08-29 ENCOUNTER — OFFICE VISIT (OUTPATIENT)
Facility: CLINIC | Age: 64
End: 2024-08-29
Payer: MEDICAID

## 2024-08-29 VITALS
OXYGEN SATURATION: 98 % | HEIGHT: 66 IN | HEART RATE: 84 BPM | BODY MASS INDEX: 21.24 KG/M2 | RESPIRATION RATE: 18 BRPM | DIASTOLIC BLOOD PRESSURE: 84 MMHG | TEMPERATURE: 96.8 F | WEIGHT: 132.2 LBS | SYSTOLIC BLOOD PRESSURE: 99 MMHG

## 2024-08-29 DIAGNOSIS — Z00.00 MEDICARE ANNUAL WELLNESS VISIT, SUBSEQUENT: Primary | ICD-10-CM

## 2024-08-29 DIAGNOSIS — R63.4 UNINTENTIONAL WEIGHT LOSS: ICD-10-CM

## 2024-08-29 DIAGNOSIS — D64.9 ANEMIA, UNSPECIFIED TYPE: ICD-10-CM

## 2024-08-29 DIAGNOSIS — Z12.11 COLON CANCER SCREENING: ICD-10-CM

## 2024-08-29 DIAGNOSIS — E11.65 TYPE 2 DIABETES MELLITUS WITH HYPERGLYCEMIA, WITHOUT LONG-TERM CURRENT USE OF INSULIN (HCC): ICD-10-CM

## 2024-08-29 LAB — HBA1C MFR BLD: 5.3 %

## 2024-08-29 PROCEDURE — G0439 PPPS, SUBSEQ VISIT: HCPCS | Performed by: STUDENT IN AN ORGANIZED HEALTH CARE EDUCATION/TRAINING PROGRAM

## 2024-08-29 PROCEDURE — 83036 HEMOGLOBIN GLYCOSYLATED A1C: CPT | Performed by: STUDENT IN AN ORGANIZED HEALTH CARE EDUCATION/TRAINING PROGRAM

## 2024-08-29 PROCEDURE — 99214 OFFICE O/P EST MOD 30 MIN: CPT | Performed by: STUDENT IN AN ORGANIZED HEALTH CARE EDUCATION/TRAINING PROGRAM

## 2024-08-29 PROCEDURE — 1123F ACP DISCUSS/DSCN MKR DOCD: CPT | Performed by: STUDENT IN AN ORGANIZED HEALTH CARE EDUCATION/TRAINING PROGRAM

## 2024-08-29 ASSESSMENT — PATIENT HEALTH QUESTIONNAIRE - PHQ9
SUM OF ALL RESPONSES TO PHQ9 QUESTIONS 1 & 2: 0
SUM OF ALL RESPONSES TO PHQ QUESTIONS 1-9: 0
1. LITTLE INTEREST OR PLEASURE IN DOING THINGS: NOT AT ALL
SUM OF ALL RESPONSES TO PHQ QUESTIONS 1-9: 0
SUM OF ALL RESPONSES TO PHQ QUESTIONS 1-9: 0
2. FEELING DOWN, DEPRESSED OR HOPELESS: NOT AT ALL
SUM OF ALL RESPONSES TO PHQ QUESTIONS 1-9: 0

## 2024-08-29 ASSESSMENT — LIFESTYLE VARIABLES
HOW MANY STANDARD DRINKS CONTAINING ALCOHOL DO YOU HAVE ON A TYPICAL DAY: PATIENT DOES NOT DRINK
HOW OFTEN DO YOU HAVE A DRINK CONTAINING ALCOHOL: NEVER

## 2024-08-29 NOTE — PROGRESS NOTES
Medicare Annual Wellness Visit    Ashley Molina is here for Medicare AWV and Follow-up (3 mo follow up)    Assessment & Plan   Type 2 diabetes mellitus with hyperglycemia, without long-term current use of insulin (HCC)  -     AMB POC HEMOGLOBIN A1C  Unintentional weight loss  -     Thyroid Cascade Profile; Future  -     Urinalysis with Microscopic; Future  -     Sedimentation Rate; Future  -     XR CHEST (2 VW); Future  -     PTH, Intact; Future  -     Ferritin; Future  -     Iron and TIBC; Future  -     [facility] - Jaycob Shah MD, [facility] ([place])  Anemia, unspecified type  -     Ferritin; Future  -     Iron and TIBC; Future  Colon cancer screening  -     [facility] - Jaycob Shah MD, [facility] ([place])    Recommendations for Preventive Services Due: see orders and patient instructions/AVS.  Recommended screening schedule for the next 5-10 years is provided to the patient in written form: see Patient Instructions/AVS.     No follow-ups on file.     Subjective       Patient's complete Health Risk Assessment and screening values have been reviewed and are found in Flowsheets. The following problems were reviewed today and where indicated follow up appointments were made and/or referrals ordered.    Positive Risk Factor Screenings with Interventions:    Fall Risk:  Do you feel unsteady or are you worried about falling? : no  2 or more falls in past year?: (!) yes  Fall with injury in past year?: no     Interventions:    Reviewed medications, home hazards, visual acuity, and co-morbidities that can increase risk for falls    Cognitive:   Clock Drawing Test (CDT):  (patient is unable to write.)  Words recalled: 2 Words Recalled     Total Score Interpretation: Abnormal Mini-Cog  Interventions:  Patient with intellectual dissability            Inactivity:  On average, how many days per week do you engage in moderate to strenuous exercise (like a brisk walk)?: 0 days (!)

## 2024-08-29 NOTE — PROGRESS NOTES
Chronic Disease Follow up    Ashley Molina (: 1960) is a 64 y.o. male here for evaluation of the following chief concerns(s):  Medicare AWV and Follow-up (3 mo follow up)       ASSESSMENT/PLAN:  1. Medicare annual wellness visit, subsequent  2. Type 2 diabetes mellitus with hyperglycemia, without long-term current use of insulin (HCC)  -     AMB POC HEMOGLOBIN A1C  -     CBC with Auto Differential; Future  -     Comprehensive Metabolic Panel; Future  3. Unintentional weight loss  -     Thyroid Cascade Profile; Future  -     Urinalysis with Microscopic; Future  -     Sedimentation Rate; Future  -     XR CHEST (2 VW); Future  -     PTH, Intact; Future  -     Ferritin; Future  -     Iron and TIBC; Future  -     [facility] - Jaycob Shah MD, GastroenterologyPawel ([address])  -     CBC with Auto Differential; Future  -     Comprehensive Metabolic Panel; Future  4. Anemia, unspecified type  -     Ferritin; Future  -     Iron and TIBC; Future  5. Colon cancer screening  -     [facility] - Jaycob Shah MD, Gastroenterology, [place] ([address])    Orders Placed This Encounter   Procedures    XR CHEST (2 VW)     Standing Status:   Future     Standing Expiration Date:   2025     Order Specific Question:   Reason for exam:     Answer:   Unintentional weight loss    Thyroid Cascade Profile     Standing Status:   Future     Standing Expiration Date:   2025    Urinalysis with Microscopic     Standing Status:   Future     Standing Expiration Date:   2025    Sedimentation Rate     Standing Status:   Future     Standing Expiration Date:   2025    PTH, Intact     Standing Status:   Future     Standing Expiration Date:   2025    Ferritin     Standing Status:   Future     Standing Expiration Date:   2025    Iron and TIBC     Standing Status:   Future     Standing Expiration Date:   2025    CBC with Auto Differential     Standing Status:   Future     Standing Expiration Date:   2025  5.3 % Final   Yearly ophthalmology visit: yes - Dr. Farley- patient is legally blind since childhood.   Sees podiatry: no- Checks feet regularly, no open wounds or issues.  On statin and tolerating well: Yes     Takes Tizanidine PRN for muscle spasms in back     HM/Social   Never smoker   Does not drink alcohol  Never had crc screening    Vitals:    08/29/24 1035   BP: 99/84   Site: Right Upper Arm   Position: Sitting   Cuff Size: Medium Adult   Pulse: 84   Resp: 18   Temp: 96.8 °F (36 °C)   TempSrc: Temporal   SpO2: 98%   Weight: 60 kg (132 lb 3.2 oz)   Height: 1.676 m (5' 6\")      Body mass index is 21.34 kg/m².     Gen: NAD, well appearing   Heart: RRR, no m/g/r  Lungs: CTA bilaterally, no wheezing, breathing comfortably  Abd: Soft, non tender to palpation  Psych: cooperative. Appropriate mood and affect.    I reviewed prior available labs.     Medical decision making complexity: moderate    I have discussed the diagnosis with the patient and the intended plan as seen in the above orders.  The patient has received an after-visit summary and questions were answered concerning future plans.  I have discussed medication side effects and warnings with the patient as well. I have reviewed the plan of care with the patient, accepted their input and they are in agreement with the treatment goals. Previous lab and imaging results were reviewed by me.     Elissa Hawley MD   Family Medicine

## 2024-08-29 NOTE — PROGRESS NOTES
Ashley Molina presents today for   Chief Complaint   Patient presents with    Medicare AWV    Follow-up     3 mo follow up       Is someone accompanying this pt? yes    Is the patient using any DME equipment during OV? no    Risk Factor Screenings with Interventions     Fall Risk:  2 or more falls in past year?: (!) yes  Fall with injury in past year?: no    Alcohol:  Alcohol Use: Not At Risk (8/29/2024)    AUDIT-C     Frequency of Alcohol Consumption: Never     Average Number of Drinks: Patient does not drink     Frequency of Binge Drinking: Never       Depression:  PHQ-2 Score: 0    Cognitive:  Clock Drawing Test (CDT):  (patient is unable to write.)  Words recalled: 2 Words Recalled  Total Score Interpretation: Abnormal Mini-Cog    Health Risk Assessment:     General  In general, how would you say your health is?: Good  In the past 7 days, have you experienced any of the following: New or Increased Pain, New or Increased Fatigue, Loneliness, Social Isolation, Stress or Anger?: No      Health Habits/Nutrition  On average, how many days per week do you engage in moderate to strenuous exercise (like a brisk walk)?: 0 days  On average, how many minutes do you engage in exercise at this level?: 0 min  Do you eat balanced/healthy meals regularly?: Yes  Have you seen the dentist within the past year?: Yes  Body mass index is 21.34 kg/m².      Hearing/Vision  Have you had an eye exam within the past year?: Yes  Do you have difficulty driving, watching TV, or doing any of your daily activities because of your eyesight?: (!) Yes (watching tv)  Do you or your family notice any trouble with your hearing that hasn't been managed with hearing aids?: No      Safety  Do you have working smoke detectors?: Yes  Do you have any tripping hazards - loose or unsecured carpets or rugs?: No  Do you have non-slip mats or non-slip surfaces or shower bars or grab bars in your shower or bathtub?: (!) No  Do you always fasten your seatbelt

## 2024-08-29 NOTE — PATIENT INSTRUCTIONS
Preventing Falls: Care Instructions  Injuries and health problems such as trouble walking or poor eyesight can increase your risk of falling. So can some medicines. But there are things you can do to help prevent falls. You can exercise to get stronger. You can also arrange your home to make it safer.    Talk to your doctor about the medicines you take. Ask if any of them increase the risk of falls and whether they can be changed or stopped.   Try to exercise regularly. It can help improve your strength and balance. This can help lower your risk of falling.         Practice fall safety and prevention.   Wear low-heeled shoes that fit well and give your feet good support. Talk to your doctor if you have foot problems that make this hard.  Carry a cellphone or wear a medical alert device that you can use to call for help.  Use stepladders instead of chairs to reach high objects. Don't climb if you're at risk for falls. Ask for help, if needed.  Wear the correct eyeglasses, if you need them.        Make your home safer.   Remove rugs, cords, clutter, and furniture from walkways.  Keep your house well lit. Use night-lights in hallways and bathrooms.  Install and use sturdy handrails on stairways.  Wear nonskid footwear, even inside. Don't walk barefoot or in socks without shoes.        Be safe outside.   Use handrails, curb cuts, and ramps whenever possible.  Keep your hands free by using a shoulder bag or backpack.  Try to walk in well-lit areas. Watch out for uneven ground, changes in pavement, and debris.  Be careful in the winter. Walk on the grass or gravel when sidewalks are slippery. Use de-icer on steps and walkways. Add non-slip devices to shoes.    Put grab bars and nonskid mats in your shower or tub and near the toilet. Try to use a shower chair or bath bench when bathing.   Get into a tub or shower by putting in your weaker leg first. Get out with your strong side first. Have a phone or medical alert  Aging online.  You need 3072-6536 mg of calcium and 3483-2682 IU of vitamin D per day. It is possible to meet your calcium requirement with diet alone, but a vitamin D supplement is usually necessary to meet this goal.  When exposed to the sun, use a sunscreen that protects against both UVA and UVB radiation with an SPF of 30 or greater. Reapply every 2 to 3 hours or after sweating, drying off with a towel, or swimming.  Always wear a seat belt when traveling in a car. Always wear a helmet when riding a bicycle or motorcycle.

## 2024-08-29 NOTE — PROGRESS NOTES
Fingerstick for HBa1C done in first finger by Danielito Hollins MA per order of  Elissa Hawley MD  after cleaning area with alcohol wipe.  Patient tolerated procedure well.

## 2024-08-30 ENCOUNTER — TELEPHONE (OUTPATIENT)
Age: 64
End: 2024-08-30

## 2024-08-30 ENCOUNTER — HOSPITAL ENCOUNTER (OUTPATIENT)
Age: 64
End: 2024-08-30
Payer: MEDICAID

## 2024-08-30 DIAGNOSIS — D64.9 ANEMIA, UNSPECIFIED TYPE: ICD-10-CM

## 2024-08-30 DIAGNOSIS — E11.65 TYPE 2 DIABETES MELLITUS WITH HYPERGLYCEMIA, WITHOUT LONG-TERM CURRENT USE OF INSULIN (HCC): ICD-10-CM

## 2024-08-30 DIAGNOSIS — R63.4 UNINTENTIONAL WEIGHT LOSS: ICD-10-CM

## 2024-08-30 LAB
ALBUMIN SERPL-MCNC: 3.9 G/DL (ref 3.4–5)
ALBUMIN/GLOB SERPL: 1.1 (ref 0.8–1.7)
ALP SERPL-CCNC: 62 U/L (ref 45–117)
ALT SERPL-CCNC: 31 U/L (ref 16–61)
ANION GAP SERPL CALC-SCNC: 7 MMOL/L (ref 3–18)
APPEARANCE UR: CLEAR
AST SERPL W P-5'-P-CCNC: 20 U/L (ref 10–38)
BACTERIA URNS QL MICRO: NEGATIVE /HPF
BASOPHILS # BLD: 0.1 K/UL (ref 0–0.1)
BASOPHILS NFR BLD: 1 % (ref 0–2)
BILIRUB SERPL-MCNC: 0.8 MG/DL (ref 0.2–1)
BILIRUB UR QL: NEGATIVE
BUN SERPL-MCNC: 21 MG/DL (ref 7–18)
BUN/CREAT SERPL: 16 (ref 12–20)
CA-I BLD-MCNC: 10.6 MG/DL (ref 8.5–10.1)
CHLORIDE SERPL-SCNC: 108 MMOL/L (ref 100–111)
CO2 SERPL-SCNC: 25 MMOL/L (ref 21–32)
COLOR UR: YELLOW
CREAT SERPL-MCNC: 1.32 MG/DL (ref 0.6–1.3)
DIFFERENTIAL METHOD BLD: ABNORMAL
EOSINOPHIL # BLD: 0.4 K/UL (ref 0–0.4)
EOSINOPHIL NFR BLD: 6 % (ref 0–5)
EPITH CASTS URNS QL MICRO: ABNORMAL /LPF (ref 0–20)
ERYTHROCYTE [DISTWIDTH] IN BLOOD BY AUTOMATED COUNT: 14.7 % (ref 11.6–14.5)
ERYTHROCYTE [SEDIMENTATION RATE] IN BLOOD: 20 MM/HR (ref 0–20)
GLOBULIN SER CALC-MCNC: 3.4 G/DL (ref 2–4)
GLUCOSE SERPL-MCNC: 66 MG/DL (ref 74–99)
GLUCOSE UR STRIP.AUTO-MCNC: >1000 MG/DL
HCT VFR BLD AUTO: 40.3 % (ref 36–48)
HGB BLD-MCNC: 13 G/DL (ref 13–16)
HGB UR QL STRIP: NEGATIVE
IMM GRANULOCYTES # BLD AUTO: 0 K/UL (ref 0–0.04)
IMM GRANULOCYTES NFR BLD AUTO: 0 % (ref 0–0.5)
KETONES UR QL STRIP.AUTO: NEGATIVE MG/DL
LEUKOCYTE ESTERASE UR QL STRIP.AUTO: NEGATIVE
LYMPHOCYTES # BLD: 2.1 K/UL (ref 0.9–3.6)
LYMPHOCYTES NFR BLD: 34 % (ref 21–52)
MCH RBC QN AUTO: 27.9 PG (ref 24–34)
MCHC RBC AUTO-ENTMCNC: 32.3 G/DL (ref 31–37)
MCV RBC AUTO: 86.5 FL (ref 78–100)
MONOCYTES # BLD: 0.6 K/UL (ref 0.05–1.2)
MONOCYTES NFR BLD: 10 % (ref 3–10)
NEUTS SEG # BLD: 2.9 K/UL (ref 1.8–8)
NEUTS SEG NFR BLD: 49 % (ref 40–73)
NITRITE UR QL STRIP.AUTO: NEGATIVE
NRBC # BLD: 0 K/UL (ref 0–0.01)
NRBC BLD-RTO: 0 PER 100 WBC
PH UR STRIP: 5.5 (ref 5–8)
PLATELET # BLD AUTO: 213 K/UL (ref 135–420)
PMV BLD AUTO: 11 FL (ref 9.2–11.8)
POTASSIUM SERPL-SCNC: 4.7 MMOL/L (ref 3.5–5.5)
PROT SERPL-MCNC: 7.3 G/DL (ref 6.4–8.2)
PROT UR STRIP-MCNC: NEGATIVE MG/DL
RBC # BLD AUTO: 4.66 M/UL (ref 4.35–5.65)
RBC #/AREA URNS HPF: ABNORMAL /HPF (ref 0–2)
SODIUM SERPL-SCNC: 140 MMOL/L (ref 136–145)
SP GR UR REFRACTOMETRY: 1.03 (ref 1–1.03)
UROBILINOGEN UR QL STRIP.AUTO: 1 EU/DL (ref 0.2–1)
WBC # BLD AUTO: 6 K/UL (ref 4.6–13.2)
WBC URNS QL MICRO: ABNORMAL /HPF (ref 0–4)

## 2024-08-30 PROCEDURE — 85651 RBC SED RATE NONAUTOMATED: CPT

## 2024-08-30 PROCEDURE — 85025 COMPLETE CBC W/AUTO DIFF WBC: CPT

## 2024-08-30 PROCEDURE — 36415 COLL VENOUS BLD VENIPUNCTURE: CPT

## 2024-08-30 PROCEDURE — 71046 X-RAY EXAM CHEST 2 VIEWS: CPT

## 2024-08-30 PROCEDURE — 81001 URINALYSIS AUTO W/SCOPE: CPT

## 2024-08-30 PROCEDURE — 83970 ASSAY OF PARATHORMONE: CPT

## 2024-08-30 PROCEDURE — 80053 COMPREHEN METABOLIC PANEL: CPT

## 2024-08-30 PROCEDURE — 84443 ASSAY THYROID STIM HORMONE: CPT

## 2024-08-30 PROCEDURE — 83540 ASSAY OF IRON: CPT

## 2024-08-30 PROCEDURE — 82728 ASSAY OF FERRITIN: CPT

## 2024-08-30 NOTE — TELEPHONE ENCOUNTER
Referral for R63.4ICD-10-CMUnintentional weight loss  Z12.11ICD-10-CMColon cancer screening   Please review and advise.      Referral  Referral # [id number]  Referral Information    Referral # Creation Date Referral Status Status Update    [id number] 08/29/2024 Pending Review 08/30/2024: Status History     Status Reason Referral Type Referral Reasons Referral Class   Other Eval and Treat Specialty Services Required Internal     To Specialty To Provider To Location/Place of Service To Department   Gastroenterology Jaycob Shah MD none HR [facility] - GASTROENTEROLOGY     To Vendor Referred By By Location/Place of Service By Department   none Elissa Hawley MD [facility] HR [facility]     Priority Start Date Expiration Date Referral Entered By   Routine 08/29/2024 08/29/2025 Elissa Hawley MD     Visits Requested Visits Authorized Visits Completed Visits Scheduled   2 2       Coverages    Payer Plan Auth. Required? Covered? Member # Authorized From Expires Auth # Precert. # Comment   [employer] MEDICARE [address] [employer] DUAL COMP HMO SNP -- Covered [id number] 1/1/2024 -- -- -- --   [facility] MEDICAID [facility] -- Covered [id number] 1/1/2024 -- -- -- --     Referral Information    Referral # Creation Date Referral Status Status Update    [id number] 08/29/2024 Pending Review 08/30/2024: Status History     Status Reason Referral Type Referral Reasons Referral Class   Other Eval and Treat Specialty Services Required Internal     To Specialty To Provider To Location/Place of Service To Department   Gastroenterology Jaycob Shah MD none HR [facility] - GASTROENTEROLOGY     To Vendor Referred By By Location/Place of Service By Department   none Elissa Hawley MD [facility]     Priority Start Date Expiration Date  Referral Entered By   Routine 08/29/2024 08/29/2025 Elissa Hawley MD     Visits Requested Visits Authorized Visits Completed Visits Scheduled   2 2       Procedure Information    Service Details  Procedure Modifiers Provider Requested Approved   REF25 - AMB REFERRAL TO GASTROENTEROLOGY none Jaycob Shah MD 2 2     Diagnosis Information    Diagnosis   R63.4 (ICD-10-CM) - Unintentional weight loss   Z12.11 (ICD-10-CM) - Colon cancer screening     Service-Level Authorizations    No service level authorizations were found.  Referral Notes  Number of Notes: 2  .  Type Date User Summary Attachment   Specialty Comments 08/30/2024 10:37 AM Venkat Watson Sent to provider to review. -   Note:  Sent to provider to review.   .  Type Date User Summary Attachment   Provider Comments 08/29/2024 11:27 AM Elissa Hawley MD Provider Comments -   Note:  The patient can be scheduled with any member of the group, including the provider with the first available appointments.   Referral Order    Order   [facility] - Jaycob Shah MD, Gastroenterology, [place] ([place]) (Order # [id number]) on 08/29/2024   View Encounter        Triage Information    Decision: (none)   Priority: Routine   Schedule by date: 9/28/2024     Scheduling Instructions    [facility] Gastroenterology            Service-Level Authorizations    No service level authorizations were found.

## 2024-08-31 LAB
CA-I BLD-MCNC: 10.9 MG/DL (ref 8.5–10.1)
FERRITIN SERPL-MCNC: 44 NG/ML (ref 8–388)
IRON SATN MFR SERPL: 31 % (ref 20–50)
IRON SERPL-MCNC: 92 UG/DL (ref 50–175)
PTH-INTACT SERPL-MCNC: 88.1 PG/ML (ref 18.4–88)
TIBC SERPL-MCNC: 297 UG/DL (ref 250–450)

## 2024-09-01 LAB — TSH SERPL DL<=0.05 MIU/L-ACNC: 1.33 UIU/ML (ref 0.45–4.5)

## 2024-10-03 ENCOUNTER — SCHEDULED TELEPHONE ENCOUNTER (OUTPATIENT)
Age: 64
End: 2024-10-03

## 2024-10-03 ENCOUNTER — OFFICE VISIT (OUTPATIENT)
Facility: CLINIC | Age: 64
End: 2024-10-03
Payer: MEDICAID

## 2024-10-03 VITALS
DIASTOLIC BLOOD PRESSURE: 71 MMHG | HEART RATE: 78 BPM | WEIGHT: 134.4 LBS | SYSTOLIC BLOOD PRESSURE: 111 MMHG | RESPIRATION RATE: 18 BRPM | HEIGHT: 66 IN | TEMPERATURE: 96.9 F | BODY MASS INDEX: 21.6 KG/M2 | OXYGEN SATURATION: 100 %

## 2024-10-03 DIAGNOSIS — R63.4 UNINTENTIONAL WEIGHT LOSS: ICD-10-CM

## 2024-10-03 DIAGNOSIS — R63.4 UNINTENTIONAL WEIGHT LOSS: Primary | ICD-10-CM

## 2024-10-03 DIAGNOSIS — Z12.11 ENCOUNTER FOR SCREENING COLONOSCOPY: ICD-10-CM

## 2024-10-03 PROCEDURE — 1123F ACP DISCUSS/DSCN MKR DOCD: CPT | Performed by: STUDENT IN AN ORGANIZED HEALTH CARE EDUCATION/TRAINING PROGRAM

## 2024-10-03 PROCEDURE — 99214 OFFICE O/P EST MOD 30 MIN: CPT | Performed by: STUDENT IN AN ORGANIZED HEALTH CARE EDUCATION/TRAINING PROGRAM

## 2024-10-03 RX ORDER — POLYETHYLENE GLYCOL 3350, SODIUM SULFATE ANHYDROUS, SODIUM BICARBONATE, SODIUM CHLORIDE, POTASSIUM CHLORIDE 236; 22.74; 6.74; 5.86; 2.97 G/4L; G/4L; G/4L; G/4L; G/4L
4 POWDER, FOR SOLUTION ORAL ONCE
Qty: 4000 ML | Refills: 0 | Status: SHIPPED | OUTPATIENT
Start: 2024-10-03 | End: 2024-10-03

## 2024-10-03 NOTE — PROGRESS NOTES
Patient scheduled for and EGD and Clonoscopy on 11/12/2024  Instructions emailed and mailed to the patient  RX sent to nurse  Surgical Registration Form scanned.  Case Request Opened

## 2024-10-03 NOTE — PROGRESS NOTES
SICK VISIT     Ashley Molina (: 1960) is a 64 y.o. male here for evaluation of the following chief concerns(s):  1 Month Follow-Up       ASSESSMENT/PLAN:  1. Unintentional weight loss    No orders of the defined types were placed in this encounter.    Weight is stable which is great.  Continue to hold Jardiance, continue metformin alone for diabetes.  I gave him the phone number to [facility] to reschedule colonoscopy.  Will plan to follow-up in 3 months for weight check and repeat A1c.      Patient agrees with plan as above and has no additional questions at this time.     SUBJECTIVE/OBJECTIVE:    Patient presents for follow-up weight loss.  Weight has been stable since last visit, actually gained 2 pounds.  He has stopped taking Jardiance.  Fasting blood sugars have stayed in the low 100s.  Still has a good appetite and eating well.  No new symptoms.  I had referred him to GI for colonoscopy, apparently they had not updated their phone number-so GI was not able to reach them.    Labs reviewed with patient.  Mildly elevated calcium and borderline PTH, but nothing necessarily to explain the weight loss.    Vitals:    10/03/24 1120   BP: 111/71   Site: Right Upper Arm   Position: Sitting   Cuff Size: Medium Adult   Pulse: 78   Resp: 18   Temp: 96.9 °F (36.1 °C)   TempSrc: Temporal   SpO2: 100%   Weight: 61 kg (134 lb 6.4 oz)   Height: 1.676 m (5' 6\")      Body mass index is 21.69 kg/m².     Gen: NAD, well appearing   Psych: cooperative. Appropriate mood and affect.    Medical decision making complexity: moderate    I have discussed the diagnosis with the patient and the intended plan as seen in the above orders.  The patient has received an after-visit summary and questions were answered concerning future plans.  I have discussed medication side effects and warnings with the patient as well. I have reviewed the plan of care with the patient, accepted their input and they are in agreement with the

## 2024-10-03 NOTE — PROGRESS NOTES
Ashley Molina presents today for   Chief Complaint   Patient presents with    1 Month Follow-Up       Is someone accompanying this pt? yes    Is the patient using any DME equipment during OV? no    Health Maintenance Due   Topic Date Due    Pneumococcal 65+ years Vaccine (1 of 2 - PCV) Never done    Diabetic foot exam  Never done    Diabetic Alb to Cr ratio (uACR) test  Never done    Diabetic retinal exam  Never done    Shingles vaccine (1 of 2) Never done    Respiratory Syncytial Virus (RSV) Pregnant or age 60 yrs+ (1 - 1-dose 60+ series) Never done    Colorectal Cancer Screen  10/14/2021    Flu vaccine (1) 08/01/2024    COVID-19 Vaccine (3 - 2023-24 season) 09/01/2024       \"Have you been to the ER, urgent care clinic since your last visit?  Hospitalized since your last visit?\"    NO    “Have you seen or consulted any other health care providers outside of [facility] since your last visit?”    NO    “Have you had a colorectal cancer screening such as a colonoscopy/FIT/Cologuard?    NO    Date of last Colonoscopy: 10/14/2016  No cologuard on file  No FIT/FOBT on file   No flexible sigmoidoscopy on file

## 2024-11-01 ENCOUNTER — ANESTHESIA EVENT (OUTPATIENT)
Age: 64
End: 2024-11-01
Payer: MEDICARE

## 2024-11-01 RX ORDER — SODIUM CHLORIDE, SODIUM LACTATE, POTASSIUM CHLORIDE, CALCIUM CHLORIDE 600; 310; 30; 20 MG/100ML; MG/100ML; MG/100ML; MG/100ML
INJECTION, SOLUTION INTRAVENOUS CONTINUOUS
Status: CANCELLED | OUTPATIENT
Start: 2024-11-01

## 2024-11-01 RX ORDER — SODIUM CHLORIDE 0.9 % (FLUSH) 0.9 %
5-40 SYRINGE (ML) INJECTION PRN
Status: CANCELLED | OUTPATIENT
Start: 2024-11-01

## 2024-11-01 RX ORDER — SODIUM CHLORIDE 9 MG/ML
INJECTION, SOLUTION INTRAVENOUS PRN
Status: CANCELLED | OUTPATIENT
Start: 2024-11-01

## 2024-11-02 PROBLEM — Z12.11 ENCOUNTER FOR SCREENING COLONOSCOPY: Status: RESOLVED | Noted: 2024-10-03 | Resolved: 2024-11-02

## 2024-11-06 PROBLEM — Z12.11 ENCOUNTER FOR SCREENING COLONOSCOPY: Status: ACTIVE | Noted: 2024-10-03

## 2024-11-07 ENCOUNTER — PREP FOR PROCEDURE (OUTPATIENT)
Age: 64
End: 2024-11-07

## 2024-11-07 DIAGNOSIS — R63.4 WEIGHT LOSS: Primary | ICD-10-CM

## 2024-11-07 DIAGNOSIS — Z12.11 COLON CANCER SCREENING: ICD-10-CM

## 2024-11-07 RX ORDER — SODIUM CHLORIDE, SODIUM LACTATE, POTASSIUM CHLORIDE, CALCIUM CHLORIDE 600; 310; 30; 20 MG/100ML; MG/100ML; MG/100ML; MG/100ML
INJECTION, SOLUTION INTRAVENOUS CONTINUOUS
Status: CANCELLED | OUTPATIENT
Start: 2024-11-07

## 2024-11-07 NOTE — H&P
Open access updated H&P.      Brief history: The patient is male Black /  64 y.o. who was referred for unintentional weight loss and colon cancer screening to be evaluated by EGD and colonoscopy.  Review of the records showed that they had few if any health problems, excessive obesity, or significant medications that would require office evaluation prior to EGD and colonoscopy for unintentional weight loss and colon cancer screening.  After review of their chart, contact was made with the patient in discussion and literature sent regarding the procedure and the bowel preparation.  They are here now for their procedure.        Past medical and surgical history:   Past Medical History:   Diagnosis Date    Type 2 diabetes mellitus without complication (HCC)     History reviewed. No pertinent surgical history.     Allergies:  No Known Allergies     Medications:  No current facility-administered medications for this encounter.    Current Outpatient Medications:     metFORMIN (GLUCOPHAGE) 1000 MG tablet, TAKE 1 TABLET BY MOUTH TWICE DAILY WITH MEALS, Disp: 180 tablet, Rfl: 1    atorvastatin (LIPITOR) 20 MG tablet, Take 1 tablet by mouth nightly, Disp: , Rfl:     ONETOUCH ULTRA strip, USE 1 STRIP TO CHECK GLUCOSE ONCE DAILY, Disp: , Rfl:     tiZANidine (ZANAFLEX) 2 MG tablet, TAKE 2 TABLETS BY MOUTH TWICE DAILY AS NEEDED, Disp: , Rfl:     aspirin 81 MG EC tablet, Take 1 tablet by mouth daily, Disp: , Rfl:      Family history:  The patient has a family history of no known GI disease.    Social history :    Social Connections: Not on file        ROS:   Review of Systems - negative     Vital signs:  Wt 77.1 kg (170 lb)   BMI 27.44 kg/m²     PE: Healthy appearing male  HEENT: Normocephalic atraumatic.  No oral abnormalities.  Lungs: Clear to auscultation percussion.  Heart: Regular rate and rhythm without murmur rub or gallop.  Abdomen: Normal bowel sounds, soft nontender without hepatosplenomegaly or

## 2024-11-12 ENCOUNTER — ANESTHESIA (OUTPATIENT)
Age: 64
End: 2024-11-12
Payer: MEDICARE

## 2024-11-12 ENCOUNTER — TELEPHONE (OUTPATIENT)
Age: 64
End: 2024-11-12

## 2024-11-12 NOTE — TELEPHONE ENCOUNTER
Patient is rescheduled for Dec. 10th for EGD/colonoscopy. Unable to come today due to saying he didn't receive a call regarding arrival time. Patient/, Neris was informed that OR staff tried to call but due to vmail being full could not leave a message. Verified both phone numbers that are in the chart. [phone] is Neris,  and [phone] is Neris's mother. Okay to leave vmail at either number. Informed Neris that she should receive a call from OR staff on Dec. 6th if not before regarding the arrival time. Told her that if she did not receive a call prior to 2pm on Dec. 6th to contact the office.

## 2024-12-02 ENCOUNTER — TELEPHONE (OUTPATIENT)
Facility: CLINIC | Age: 64
End: 2024-12-02

## 2024-12-02 NOTE — TELEPHONE ENCOUNTER
Patients sister called stating that the patients Glucose monitor broke and there needs to be an order put in for another one. The pharmacy would not fill it with out an order from the provider.     [facility] john Armas

## 2024-12-03 DIAGNOSIS — E11.65 TYPE 2 DIABETES MELLITUS WITH HYPERGLYCEMIA, WITHOUT LONG-TERM CURRENT USE OF INSULIN (HCC): Primary | ICD-10-CM

## 2024-12-03 RX ORDER — BLOOD-GLUCOSE METER
1 KIT MISCELLANEOUS DAILY
Qty: 1 KIT | Refills: 0 | Status: SHIPPED | OUTPATIENT
Start: 2024-12-03

## 2024-12-06 PROBLEM — Z12.11 ENCOUNTER FOR SCREENING COLONOSCOPY: Status: RESOLVED | Noted: 2024-10-03 | Resolved: 2024-12-06

## 2024-12-09 RX ORDER — SODIUM CHLORIDE, SODIUM LACTATE, POTASSIUM CHLORIDE, CALCIUM CHLORIDE 600; 310; 30; 20 MG/100ML; MG/100ML; MG/100ML; MG/100ML
INJECTION, SOLUTION INTRAVENOUS CONTINUOUS
Status: CANCELLED | OUTPATIENT
Start: 2024-12-09

## 2024-12-09 NOTE — H&P
Open access updated H&P.      Brief history: The patient is male Black /  64 y.o. who was referred for unintentional weight loss and colon cancer screening to be evaluated by EGD and colonoscopy.  Review of the records showed that they had few if any health problems, excessive obesity, or significant medications that would require office evaluation prior to EGD and colonoscopy for unintentional weight loss and colon cancer screening.  After review of their chart, contact was made with the patient in discussion and literature sent regarding the procedure and the bowel preparation.  They are here now for their procedure.        Past medical and surgical history:   Past Medical History:   Diagnosis Date    Type 2 diabetes mellitus without complication (HCC)     History reviewed. No pertinent surgical history.     Allergies:  No Known Allergies     Medications:  No current facility-administered medications for this encounter.    Current Outpatient Medications:     glucose monitoring kit, 1 kit by Does not apply route daily, Disp: 1 kit, Rfl: 0    metFORMIN (GLUCOPHAGE) 1000 MG tablet, TAKE 1 TABLET BY MOUTH TWICE DAILY WITH MEALS, Disp: 180 tablet, Rfl: 1    atorvastatin (LIPITOR) 20 MG tablet, Take 1 tablet by mouth nightly, Disp: , Rfl:     ONETOUCH ULTRA strip, USE 1 STRIP TO CHECK GLUCOSE ONCE DAILY, Disp: , Rfl:     tiZANidine (ZANAFLEX) 2 MG tablet, TAKE 2 TABLETS BY MOUTH TWICE DAILY AS NEEDED, Disp: , Rfl:     aspirin 81 MG EC tablet, Take 1 tablet by mouth daily, Disp: , Rfl:      Family history:  The patient has a family history of no known GI disease.    Social history :    Social Connections: Not on file        ROS:   Review of Systems - negative     Vital signs:  Wt 77.1 kg (170 lb)   BMI 27.44 kg/m²     PE: Healthy appearing male  HEENT: Normocephalic atraumatic.  No oral abnormalities.  Lungs: Clear to auscultation percussion.  Heart: Regular rate and rhythm without murmur rub or

## 2024-12-10 ENCOUNTER — HOSPITAL ENCOUNTER (OUTPATIENT)
Age: 64
Setting detail: OUTPATIENT SURGERY
Discharge: HOME | End: 2024-12-10
Attending: INTERNAL MEDICINE | Admitting: INTERNAL MEDICINE
Payer: MEDICARE

## 2024-12-10 VITALS
BODY MASS INDEX: 25.71 KG/M2 | SYSTOLIC BLOOD PRESSURE: 144 MMHG | RESPIRATION RATE: 18 BRPM | OXYGEN SATURATION: 100 % | HEIGHT: 66 IN | TEMPERATURE: 98.3 F | DIASTOLIC BLOOD PRESSURE: 96 MMHG | WEIGHT: 160 LBS | HEART RATE: 85 BPM

## 2024-12-10 DIAGNOSIS — Z12.11 COLON CANCER SCREENING: ICD-10-CM

## 2024-12-10 DIAGNOSIS — R63.4 WEIGHT LOSS: ICD-10-CM

## 2024-12-10 LAB
GLUCOSE BLD STRIP.AUTO-MCNC: 53 MG/DL (ref 70–110)
GLUCOSE BLD STRIP.AUTO-MCNC: 55 MG/DL (ref 70–110)
GLUCOSE BLD STRIP.AUTO-MCNC: 55 MG/DL (ref 70–110)
GLUCOSE BLD STRIP.AUTO-MCNC: 57 MG/DL (ref 70–110)
GLUCOSE BLD STRIP.AUTO-MCNC: 59 MG/DL (ref 70–110)
GLUCOSE BLD STRIP.AUTO-MCNC: 70 MG/DL (ref 70–110)
GLUCOSE BLD STRIP.AUTO-MCNC: 71 MG/DL (ref 70–110)
PERFORMED BY:: ABNORMAL
PERFORMED BY:: NORMAL
PERFORMED BY:: NORMAL

## 2024-12-10 PROCEDURE — 2709999900 HC NON-CHARGEABLE SUPPLY: Performed by: INTERNAL MEDICINE

## 2024-12-10 PROCEDURE — 3700000000 HC ANESTHESIA ATTENDED CARE: Performed by: INTERNAL MEDICINE

## 2024-12-10 PROCEDURE — 3700000001 HC ADD 15 MINUTES (ANESTHESIA): Performed by: INTERNAL MEDICINE

## 2024-12-10 PROCEDURE — 7100000011 HC PHASE II RECOVERY - ADDTL 15 MIN: Performed by: INTERNAL MEDICINE

## 2024-12-10 PROCEDURE — 3600007503: Performed by: INTERNAL MEDICINE

## 2024-12-10 PROCEDURE — 88305 TISSUE EXAM BY PATHOLOGIST: CPT

## 2024-12-10 PROCEDURE — 3600007513: Performed by: INTERNAL MEDICINE

## 2024-12-10 PROCEDURE — 7100000010 HC PHASE II RECOVERY - FIRST 15 MIN: Performed by: INTERNAL MEDICINE

## 2024-12-10 PROCEDURE — 88342 IMHCHEM/IMCYTCHM 1ST ANTB: CPT

## 2024-12-10 PROCEDURE — 82962 GLUCOSE BLOOD TEST: CPT

## 2024-12-10 PROCEDURE — 6360000002 HC RX W HCPCS: Performed by: NURSE ANESTHETIST, CERTIFIED REGISTERED

## 2024-12-10 RX ORDER — PROPOFOL 10 MG/ML
INJECTION, EMULSION INTRAVENOUS
Status: DISCONTINUED | OUTPATIENT
Start: 2024-12-10 | End: 2024-12-10 | Stop reason: SDUPTHER

## 2024-12-10 RX ORDER — SODIUM CHLORIDE 0.9 % (FLUSH) 0.9 %
5-40 SYRINGE (ML) INJECTION EVERY 12 HOURS SCHEDULED
Status: DISCONTINUED | OUTPATIENT
Start: 2024-12-10 | End: 2024-12-10 | Stop reason: HOSPADM

## 2024-12-10 RX ORDER — ALBUTEROL SULFATE 0.83 MG/ML
2.5 SOLUTION RESPIRATORY (INHALATION) AS NEEDED
Status: DISCONTINUED | OUTPATIENT
Start: 2024-12-10 | End: 2024-12-10 | Stop reason: HOSPADM

## 2024-12-10 RX ORDER — SODIUM CHLORIDE, SODIUM LACTATE, POTASSIUM CHLORIDE, CALCIUM CHLORIDE 600; 310; 30; 20 MG/100ML; MG/100ML; MG/100ML; MG/100ML
INJECTION, SOLUTION INTRAVENOUS CONTINUOUS
Status: DISCONTINUED | OUTPATIENT
Start: 2024-12-10 | End: 2024-12-10 | Stop reason: HOSPADM

## 2024-12-10 RX ORDER — PHENYLEPHRINE HYDROCHLORIDE 10 MG/ML
INJECTION INTRAVENOUS
Status: DISCONTINUED | OUTPATIENT
Start: 2024-12-10 | End: 2024-12-10 | Stop reason: SDUPTHER

## 2024-12-10 RX ORDER — OMEPRAZOLE 40 MG/1
40 CAPSULE, DELAYED RELEASE ORAL
Qty: 90 CAPSULE | Refills: 3 | Status: SHIPPED | OUTPATIENT
Start: 2024-12-10

## 2024-12-10 RX ADMIN — PROPOFOL 20 MG: 10 INJECTION, EMULSION INTRAVENOUS at 13:08

## 2024-12-10 RX ADMIN — PROPOFOL 20 MG: 10 INJECTION, EMULSION INTRAVENOUS at 13:19

## 2024-12-10 RX ADMIN — PROPOFOL 20 MG: 10 INJECTION, EMULSION INTRAVENOUS at 12:58

## 2024-12-10 RX ADMIN — PROPOFOL 20 MG: 10 INJECTION, EMULSION INTRAVENOUS at 12:56

## 2024-12-10 RX ADMIN — PROPOFOL 30 MG: 10 INJECTION, EMULSION INTRAVENOUS at 13:22

## 2024-12-10 RX ADMIN — PROPOFOL 30 MG: 10 INJECTION, EMULSION INTRAVENOUS at 12:53

## 2024-12-10 RX ADMIN — PROPOFOL 20 MG: 10 INJECTION, EMULSION INTRAVENOUS at 13:04

## 2024-12-10 RX ADMIN — PROPOFOL 50 MG: 10 INJECTION, EMULSION INTRAVENOUS at 12:50

## 2024-12-10 RX ADMIN — PROPOFOL 50 MG: 10 INJECTION, EMULSION INTRAVENOUS at 13:16

## 2024-12-10 RX ADMIN — PROPOFOL 30 MG: 10 INJECTION, EMULSION INTRAVENOUS at 13:06

## 2024-12-10 RX ADMIN — PROPOFOL 30 MG: 10 INJECTION, EMULSION INTRAVENOUS at 13:25

## 2024-12-10 RX ADMIN — PROPOFOL 20 MG: 10 INJECTION, EMULSION INTRAVENOUS at 13:12

## 2024-12-10 RX ADMIN — PROPOFOL 30 MG: 10 INJECTION, EMULSION INTRAVENOUS at 13:00

## 2024-12-10 RX ADMIN — PHENYLEPHRINE HYDROCHLORIDE 200 MCG: 10 INJECTION INTRAVENOUS at 13:16

## 2024-12-10 RX ADMIN — PROPOFOL 30 MG: 10 INJECTION, EMULSION INTRAVENOUS at 13:02

## 2024-12-10 ASSESSMENT — PAIN - FUNCTIONAL ASSESSMENT
PAIN_FUNCTIONAL_ASSESSMENT: NONE - DENIES PAIN
PAIN_FUNCTIONAL_ASSESSMENT: NONE - DENIES PAIN
PAIN_FUNCTIONAL_ASSESSMENT: ADULT NONVERBAL PAIN SCALE (NPVS)
PAIN_FUNCTIONAL_ASSESSMENT: NONE - DENIES PAIN
PAIN_FUNCTIONAL_ASSESSMENT: NONE - DENIES PAIN

## 2024-12-10 NOTE — ANESTHESIA POSTPROCEDURE EVALUATION
Department of Anesthesiology  Postprocedure Note    Patient: Ashley Molina  MRN: [id number]  YOB: 1960  Date of evaluation: 12/10/2024    Procedure Summary       Date: 12/10/24 Room / Location: [facility] ENDO 01 / [facility] ENDOSCOPY    Anesthesia Start: 1234 Anesthesia Stop: 1327    Procedures:       ESOPHAGOGASTRODUODENOSCOPY with biopsies (Upper GI Region)      Colonoscopy (Lower GI Region) Diagnosis:       Unintentional weight loss      Encounter for screening colonoscopy      (Unintentional weight loss [R63.4])      (Encounter for screening colonoscopy [Z12.11])    Surgeons: Jaycob Shah MD Responsible Provider: Ivonne Mohan APRN - CRNA    Anesthesia Type: MAC ASA Status: 3            Anesthesia Type: MAC    Ayaan Phase I: Ayaan Score: 10    Ayaan Phase II:      Anesthesia Post Evaluation    Patient location during evaluation: bedside  Pain score: 0  Airway patency: patent  Nausea & Vomiting: no nausea and no vomiting  Cardiovascular status: blood pressure returned to baseline  Respiratory status: acceptable  Hydration status: stable  Pain management: adequate    No notable events documented.

## 2024-12-10 NOTE — PERIOP NOTE
Blood glucose rechecked- 53, 55 on recheck. Apple juice provided. Pt is asymptomatic. Pt mother at bedside.

## 2024-12-10 NOTE — INTERVAL H&P NOTE
Update History & Physical    The patient's History and Physical of December 10, 2024 was reviewed with the patient and I examined the patient. There was no change. The surgical site was confirmed by the patient and me.     Plan: The risks, benefits, expected outcome, and alternative to the recommended procedure have been discussed with the patient. Patient understands and wants to proceed with the procedure.     Electronically signed by Jaycob Shah MD on 12/10/2024 at 11:55 AM

## 2024-12-10 NOTE — OP NOTE
mucosal abnormalities except for the following.  There is mild gastritis in the distal body and antrum of the stomach.    Biopsies were obtained in the gastric body and antrum to exclude H. Pylori.  In addition, there is an elongated approximately 2 cm long erythematous polyp in the fundus of the stomach there is removed by hot snare polypectomy without incident.  Retroflexion view of the cardia and fundus were normal.     DUODENUM:  The duodenal bulb and descending duodenum were then evaluated and found to be normal including no masses, ulcers, or or mucosal abnormalities.      SPECIMENS: 1.  Biopsies of gastric body and antrum to exclude H. pylori.  2.  Removal of elongated large and erythematous polyp from fundus of stomach.    ESTIMATED BLOOD LOSS:  None.    COMPLICATIONS:  None     COMMENTS: none    Impression:  1.  Mild gastritis in distal body antrum of stomach.  2.  Removal of erythematous elongated gastric polyp from fundus of stomach.  3.  The above does not appear to be seen near the area enough to cause the patient's weight loss.      Recommendations:    1.  Start omeprazole 40 mg a day with electronic prescription sent to his pharmacy.  2.  Check pathology.  Will notify patient with results.  3.  Follow up as needed.  4.  Further recommendations pending clinical course as needed.  5.  Proceed with colonoscopy.    Jaycob Shah M.D.      Colonoscopy procedure note    Date of service: 12/10/2024    Type: Screening    Indication for procedure: Colon cancer screening; unintentional weight loss    Anesthesia classification: ASA class 3    Patient history and physical been accomplished and documented.  Patient is assessed and determined to be appropriate candidate for planned procedure and sedation; patient reassessed immediately prior to sedation.      Sedation plan: MAC per anesthesia    Surgical assistant: Not applicable    Informed consent obtained: Yes.  he indication, risks including but not limited

## 2024-12-10 NOTE — DISCHARGE INSTRUCTIONS
EGD:  Recommendations:    1.  Start omeprazole 40 mg a day with electronic prescription sent to his pharmacy.  2.  Check pathology.  Will notify patient with results.  3.  Follow up as needed.  4.  Further recommendations pending clinical course as needed.  5.  Proceed with colonoscopy.    Colonoscopy:

## 2024-12-10 NOTE — ANESTHESIA PRE PROCEDURE
Department of Anesthesiology  Preprocedure Note       Name:  Ashley Molina   Age:  64 y.o.  :  1960                                          MRN:  [id number]         Date:  12/10/2024      Surgeon: Surgeon(s):  Jaycob Shah MD    Procedure: Procedure(s):  ESOPHAGOGASTRODUODENOSCOPY  Colonoscopy    Medications prior to admission:   Prior to Admission medications    Medication Sig Start Date End Date Taking? Authorizing Provider   metFORMIN (GLUCOPHAGE) 1000 MG tablet TAKE 1 TABLET BY MOUTH TWICE DAILY WITH MEALS 24  Yes Elissa Hawely MD   atorvastatin (LIPITOR) 20 MG tablet Take 1 tablet by mouth nightly 3/28/23  Yes Yanet Zarate MD   tiZANidine (ZANAFLEX) 2 MG tablet TAKE 2 TABLETS BY MOUTH TWICE DAILY AS NEEDED 3/22/23  Yes Yanet Zarate MD   aspirin 81 MG EC tablet Take 1 tablet by mouth daily   Yes Yanet Zarate MD   glucose monitoring kit 1 kit by Does not apply route daily 12/3/24   Elissa Hawley MD   ONETOUCH ULTRA strip USE 1 STRIP TO CHECK GLUCOSE ONCE DAILY 3/22/23   ProviderYanet MD       Current medications:    Current Facility-Administered Medications   Medication Dose Route Frequency Provider Last Rate Last Admin   • sodium chloride flush 0.9 % injection 5-40 mL  5-40 mL IntraVENous 2 times per day Steve Newberry APRN - CRNA       • albuterol (PROVENTIL) (2.5 MG/3ML) 0.083% nebulizer solution 2.5 mg  2.5 mg Nebulization PRN Steve Newberry APRN - CRNA       • lactated ringers infusion   IntraVENous Continuous Jaycob Shah MD           Allergies:  No Known Allergies    Problem List:    Patient Active Problem List   Diagnosis Code   • Diabetes (HCC) E11.9   • Mental disability F79   • Mixed hyperlipidemia E78.2   • Spasm of muscle M62.838   • Vitamin D deficiency E55.9   • Legally blind H54.8   • Unintentional weight loss R63.4       Past Medical History:        Diagnosis Date   • Type 2 diabetes mellitus without complication ([facility])

## 2024-12-12 DIAGNOSIS — E11.65 TYPE 2 DIABETES MELLITUS WITH HYPERGLYCEMIA, WITHOUT LONG-TERM CURRENT USE OF INSULIN (HCC): ICD-10-CM

## 2024-12-12 RX ORDER — BLOOD-GLUCOSE METER
1 KIT MISCELLANEOUS DAILY
Qty: 1 KIT | Refills: 0 | Status: SHIPPED | OUTPATIENT
Start: 2024-12-12

## 2024-12-20 ENCOUNTER — TELEPHONE (OUTPATIENT)
Age: 64
End: 2024-12-20

## 2024-12-20 NOTE — TELEPHONE ENCOUNTER
----- Message from Dr. Jaycob Shah MD sent at 12/13/2024 10:20 PM EST -----  EGD pathology results:  STOMACH, BIOPSY        CHRONIC GASTRITIS WITH REACTIVE FOVEOLAR CHANGES.   NO DYSPLASIA OR MALIGNANCY IDENTIFIED.   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   BASE OF POLYPS UNINVOLVED BY HIGH-GRADE DYSPLASIA.     Summary: Gastritis with biopsies negative for H. pylori and malignancy.  The gastric polyp removed had high-grade dysplasia meaning it was getting ready to turn into cancer but did not.  It is completely removed and there is no evidence of high-grade dysplasia or malignancy below the cautery line which is good.  This is curative.    Please notify patient of the above.

## 2025-01-16 ENCOUNTER — OFFICE VISIT (OUTPATIENT)
Facility: CLINIC | Age: 65
End: 2025-01-16
Payer: MEDICARE

## 2025-01-16 VITALS
SYSTOLIC BLOOD PRESSURE: 127 MMHG | TEMPERATURE: 96.8 F | BODY MASS INDEX: 26.33 KG/M2 | HEIGHT: 66 IN | WEIGHT: 163.8 LBS | OXYGEN SATURATION: 100 % | DIASTOLIC BLOOD PRESSURE: 81 MMHG | HEART RATE: 89 BPM | RESPIRATION RATE: 18 BRPM

## 2025-01-16 DIAGNOSIS — E11.65 TYPE 2 DIABETES MELLITUS WITH HYPERGLYCEMIA, WITHOUT LONG-TERM CURRENT USE OF INSULIN (HCC): Primary | ICD-10-CM

## 2025-01-16 DIAGNOSIS — E83.52 HYPERCALCEMIA: ICD-10-CM

## 2025-01-16 DIAGNOSIS — R63.4 UNINTENTIONAL WEIGHT LOSS: ICD-10-CM

## 2025-01-16 DIAGNOSIS — E78.2 MIXED HYPERLIPIDEMIA: ICD-10-CM

## 2025-01-16 LAB — HBA1C MFR BLD: 5.7 %

## 2025-01-16 PROCEDURE — G8427 DOCREV CUR MEDS BY ELIG CLIN: HCPCS | Performed by: STUDENT IN AN ORGANIZED HEALTH CARE EDUCATION/TRAINING PROGRAM

## 2025-01-16 PROCEDURE — 1159F MED LIST DOCD IN RCRD: CPT | Performed by: STUDENT IN AN ORGANIZED HEALTH CARE EDUCATION/TRAINING PROGRAM

## 2025-01-16 PROCEDURE — G0008 ADMIN INFLUENZA VIRUS VAC: HCPCS | Performed by: STUDENT IN AN ORGANIZED HEALTH CARE EDUCATION/TRAINING PROGRAM

## 2025-01-16 PROCEDURE — 99214 OFFICE O/P EST MOD 30 MIN: CPT | Performed by: STUDENT IN AN ORGANIZED HEALTH CARE EDUCATION/TRAINING PROGRAM

## 2025-01-16 PROCEDURE — 3046F HEMOGLOBIN A1C LEVEL >9.0%: CPT | Performed by: STUDENT IN AN ORGANIZED HEALTH CARE EDUCATION/TRAINING PROGRAM

## 2025-01-16 PROCEDURE — 2022F DILAT RTA XM EVC RTNOPTHY: CPT | Performed by: STUDENT IN AN ORGANIZED HEALTH CARE EDUCATION/TRAINING PROGRAM

## 2025-01-16 PROCEDURE — G8419 CALC BMI OUT NRM PARAM NOF/U: HCPCS | Performed by: STUDENT IN AN ORGANIZED HEALTH CARE EDUCATION/TRAINING PROGRAM

## 2025-01-16 PROCEDURE — 1123F ACP DISCUSS/DSCN MKR DOCD: CPT | Performed by: STUDENT IN AN ORGANIZED HEALTH CARE EDUCATION/TRAINING PROGRAM

## 2025-01-16 PROCEDURE — 3017F COLORECTAL CA SCREEN DOC REV: CPT | Performed by: STUDENT IN AN ORGANIZED HEALTH CARE EDUCATION/TRAINING PROGRAM

## 2025-01-16 PROCEDURE — 83036 HEMOGLOBIN GLYCOSYLATED A1C: CPT | Performed by: STUDENT IN AN ORGANIZED HEALTH CARE EDUCATION/TRAINING PROGRAM

## 2025-01-16 PROCEDURE — 1036F TOBACCO NON-USER: CPT | Performed by: STUDENT IN AN ORGANIZED HEALTH CARE EDUCATION/TRAINING PROGRAM

## 2025-01-16 PROCEDURE — 90653 IIV ADJUVANT VACCINE IM: CPT | Performed by: STUDENT IN AN ORGANIZED HEALTH CARE EDUCATION/TRAINING PROGRAM

## 2025-01-16 RX ORDER — ATORVASTATIN CALCIUM 20 MG/1
20 TABLET, FILM COATED ORAL NIGHTLY
Qty: 90 TABLET | Refills: 1 | Status: SHIPPED | OUTPATIENT
Start: 2025-01-16

## 2025-01-16 RX ORDER — TIZANIDINE 2 MG/1
2 TABLET ORAL EVERY 8 HOURS PRN
Qty: 90 TABLET | Refills: 1 | Status: SHIPPED | OUTPATIENT
Start: 2025-01-16

## 2025-01-16 RX ORDER — BLOOD SUGAR DIAGNOSTIC
1 STRIP MISCELLANEOUS DAILY
Qty: 100 EACH | Refills: 1 | Status: SHIPPED | OUTPATIENT
Start: 2025-01-16

## 2025-01-16 SDOH — ECONOMIC STABILITY: FOOD INSECURITY: WITHIN THE PAST 12 MONTHS, THE FOOD YOU BOUGHT JUST DIDN'T LAST AND YOU DIDN'T HAVE MONEY TO GET MORE.: NEVER TRUE

## 2025-01-16 SDOH — ECONOMIC STABILITY: FOOD INSECURITY: WITHIN THE PAST 12 MONTHS, YOU WORRIED THAT YOUR FOOD WOULD RUN OUT BEFORE YOU GOT MONEY TO BUY MORE.: NEVER TRUE

## 2025-01-16 ASSESSMENT — PATIENT HEALTH QUESTIONNAIRE - PHQ9
2. FEELING DOWN, DEPRESSED OR HOPELESS: NOT AT ALL
SUM OF ALL RESPONSES TO PHQ9 QUESTIONS 1 & 2: 0
1. LITTLE INTEREST OR PLEASURE IN DOING THINGS: NOT AT ALL
SUM OF ALL RESPONSES TO PHQ QUESTIONS 1-9: 0

## 2025-01-16 NOTE — PROGRESS NOTES
Ashley Jesse presents today for   Chief Complaint   Patient presents with    3 Month Follow-Up     Patient want to discuss about the procedure he just had.    Discuss Medications       Is someone accompanying this pt? yes    Is the patient using any DME equipment during OV? no    Health Maintenance Due   Topic Date Due    Pneumococcal 65+ years Vaccine (1 of 2 - PCV) Never done    Diabetic foot exam  Never done    Diabetic Alb to Cr ratio (uACR) test  Never done    Diabetic retinal exam  Never done    Shingles vaccine (1 of 2) Never done    Flu vaccine (1) 08/01/2024    COVID-19 Vaccine (3 - 2023-24 season) 09/01/2024    Annual Wellness Visit (Medicare Advantage)  01/01/2025         \"Have you been to the ER, urgent care clinic since your last visit?  Hospitalized since your last visit?\"    NO    “Have you seen or consulted any other health care providers outside our system since your last visit?”    NO      “Have you had a diabetic eye exam?”    YES - Where:  Nurse/CMA to request most recent records if not in the chart     No diabetic eye exam on file

## 2025-01-16 NOTE — PROGRESS NOTES
Chronic Disease Follow up    Ashley Molina (: 1960) is a 64 y.o. male here for evaluation of the following chief concerns(s):  3 Month Follow-Up (Patient want to discuss about the procedure he just had.) and Discuss Medications       ASSESSMENT/PLAN:  1. Type 2 diabetes mellitus with hyperglycemia, without long-term current use of insulin (HCC)  -     AMB POC HEMOGLOBIN A1C  -     COLLECTION CAPILLARY BLOOD SPECIMEN  -     Basic Metabolic Panel; Future  -     Albumin/Creatinine Ratio, Urine; Future  2. Mixed hyperlipidemia  3. Unintentional weight loss  4. Hypercalcemia  -     PTH, Intact; Future  -     Calcium Ionized Serum; Future  -     Vitamin D 25 Hydroxy; Future      Orders Placed This Encounter   Procedures    COLLECTION CAPILLARY BLOOD SPECIMEN    Basic Metabolic Panel     Standing Status:   Future     Standing Expiration Date:   2026    Albumin/Creatinine Ratio, Urine     Standing Status:   Future     Standing Expiration Date:   2026    PTH, Intact     Standing Status:   Future     Standing Expiration Date:   2026    Calcium Ionized Serum     Standing Status:   Future     Standing Expiration Date:   2026    Vitamin D 25 Hydroxy     Standing Status:   Future     Standing Expiration Date:   2026    AMB POC HEMOGLOBIN A1C     Diabetes-A1c still very low despite stopping glipizide.  DC Jardiance for now, will continue metformin.    HLD-stable.  Continue Lipitor    Unintentional weight loss-unclear cause.  Weight is stable currently. Reviewed EGD and colonoscopy report. Labs have been normal. Per GI note, if weight loss continues--- suggest CT scan of the abdomen and pelvis with IV and oral contrast.     Mild gastritis-he is on PPI prescribed by GI.  Will continue for 3 to 4 months and then try to taper off.    Mild creatinine elevation, mild calcium elevation-recheck labs per above    Return in about 4 months (around 2025).    Patient agrees with plan as above and has no

## 2025-01-16 NOTE — PROGRESS NOTES
Fingerstick for HBa1C done in first finger by Danielito Hollins MA per order of  Dr. Elissa Hawley  after cleaning area with alcohol wipe.  Patient tolerated procedure well.

## 2025-02-03 DIAGNOSIS — E11.65 TYPE 2 DIABETES MELLITUS WITH HYPERGLYCEMIA, WITHOUT LONG-TERM CURRENT USE OF INSULIN (HCC): ICD-10-CM

## 2025-05-15 ENCOUNTER — OFFICE VISIT (OUTPATIENT)
Facility: CLINIC | Age: 65
End: 2025-05-15
Payer: MEDICARE

## 2025-05-15 VITALS
OXYGEN SATURATION: 98 % | BODY MASS INDEX: 22.5 KG/M2 | HEIGHT: 66 IN | DIASTOLIC BLOOD PRESSURE: 72 MMHG | TEMPERATURE: 97.6 F | RESPIRATION RATE: 18 BRPM | HEART RATE: 76 BPM | WEIGHT: 140 LBS | SYSTOLIC BLOOD PRESSURE: 112 MMHG

## 2025-05-15 DIAGNOSIS — R10.9 ABDOMINAL PAIN, UNSPECIFIED ABDOMINAL LOCATION: ICD-10-CM

## 2025-05-15 DIAGNOSIS — R63.4 UNINTENTIONAL WEIGHT LOSS: ICD-10-CM

## 2025-05-15 DIAGNOSIS — E83.52 HYPERCALCEMIA: ICD-10-CM

## 2025-05-15 DIAGNOSIS — K29.50 CHRONIC GASTRITIS WITHOUT BLEEDING, UNSPECIFIED GASTRITIS TYPE: ICD-10-CM

## 2025-05-15 DIAGNOSIS — D64.9 ANEMIA, UNSPECIFIED TYPE: ICD-10-CM

## 2025-05-15 DIAGNOSIS — E11.65 TYPE 2 DIABETES MELLITUS WITH HYPERGLYCEMIA, WITHOUT LONG-TERM CURRENT USE OF INSULIN (HCC): Primary | ICD-10-CM

## 2025-05-15 LAB — HBA1C MFR BLD: 5.5 %

## 2025-05-15 PROCEDURE — 3044F HG A1C LEVEL LT 7.0%: CPT | Performed by: STUDENT IN AN ORGANIZED HEALTH CARE EDUCATION/TRAINING PROGRAM

## 2025-05-15 PROCEDURE — 3017F COLORECTAL CA SCREEN DOC REV: CPT | Performed by: STUDENT IN AN ORGANIZED HEALTH CARE EDUCATION/TRAINING PROGRAM

## 2025-05-15 PROCEDURE — 1123F ACP DISCUSS/DSCN MKR DOCD: CPT | Performed by: STUDENT IN AN ORGANIZED HEALTH CARE EDUCATION/TRAINING PROGRAM

## 2025-05-15 PROCEDURE — 1036F TOBACCO NON-USER: CPT | Performed by: STUDENT IN AN ORGANIZED HEALTH CARE EDUCATION/TRAINING PROGRAM

## 2025-05-15 PROCEDURE — G8428 CUR MEDS NOT DOCUMENT: HCPCS | Performed by: STUDENT IN AN ORGANIZED HEALTH CARE EDUCATION/TRAINING PROGRAM

## 2025-05-15 PROCEDURE — 3046F HEMOGLOBIN A1C LEVEL >9.0%: CPT | Performed by: STUDENT IN AN ORGANIZED HEALTH CARE EDUCATION/TRAINING PROGRAM

## 2025-05-15 PROCEDURE — 83036 HEMOGLOBIN GLYCOSYLATED A1C: CPT | Performed by: STUDENT IN AN ORGANIZED HEALTH CARE EDUCATION/TRAINING PROGRAM

## 2025-05-15 PROCEDURE — G8420 CALC BMI NORM PARAMETERS: HCPCS | Performed by: STUDENT IN AN ORGANIZED HEALTH CARE EDUCATION/TRAINING PROGRAM

## 2025-05-15 PROCEDURE — 99215 OFFICE O/P EST HI 40 MIN: CPT | Performed by: STUDENT IN AN ORGANIZED HEALTH CARE EDUCATION/TRAINING PROGRAM

## 2025-05-15 PROCEDURE — 2022F DILAT RTA XM EVC RTNOPTHY: CPT | Performed by: STUDENT IN AN ORGANIZED HEALTH CARE EDUCATION/TRAINING PROGRAM

## 2025-05-15 NOTE — PROGRESS NOTES
Ashley Molina presents today for   Chief Complaint   Patient presents with    Follow-up     4m follow up       Is someone accompanying this pt? yes    Is the patient using any DME equipment during OV? no    Health Maintenance Due   Topic Date Due    Diabetic foot exam  Never done    Diabetic Alb to Cr ratio (uACR) test  Never done    Diabetic retinal exam  Never done    Pneumococcal 50+ years Vaccine (1 of 2 - PCV) Never done    Shingles vaccine (1 of 2) Never done    COVID-19 Vaccine (3 - 2024-25 season) 09/01/2024    Annual Wellness Visit (Medicare Advantage)  01/01/2025    Lipids  05/22/2025         \"Have you been to the ER, urgent care clinic since your last visit?  Hospitalized since your last visit?\"    NO    “Have you seen or consulted any other health care providers outside our system since your last visit?”    NO         
for muscle spasms in back     HM/Social   Never smoker   Does not drink alcohol  Colonoscopy 12/2024- normal- repeat in 10 years    Vitals:    05/15/25 0929   BP: 112/72   Pulse: 76   Resp: 18   Temp: 97.6 °F (36.4 °C)   TempSrc: Temporal   SpO2: 98%   Weight: 63.5 kg (140 lb)   Height: 1.676 m (5' 6\")      Body mass index is 22.6 kg/m².     Gen: NAD, well appearing   Ears: TM WNL bilaterally  Nose: + boggy and erythematous nasal turbinates,  no pain with palpation of sinuses  Throat: Oropharynx erythematous- no tonsillar swelling or exudates  Neck: No cervical lymphadenopathy  Heart: RRR, no m/g/r  Lungs: CTA bilaterally, no wheezing, breathing comfortably  Abd: Soft, non tender to palpation  Skin: no rashes, abnormal bruising  Psych: cooperative. Appropriate mood and affect.    I reviewed prior available labs.     On this date 05/15/25 I have spent 44 minutes reviewing previous notes, test results and face to face with the patient for interview/exam, discussing working diagnosis and treatment plan as well as documenting on the day of the visit.       I have discussed the diagnosis with the patient and the intended plan as seen in the above orders.  The patient has received an after-visit summary and questions were answered concerning future plans.  I have discussed medication side effects and warnings with the patient as well. I have reviewed the plan of care with the patient, accepted their input and they are in agreement with the treatment goals. Previous lab and imaging results were reviewed by me.     Elissa Hawley MD   Family Medicine

## 2025-05-22 ENCOUNTER — HOSPITAL ENCOUNTER (OUTPATIENT)
Age: 65
Discharge: HOME | End: 2025-05-25
Attending: STUDENT IN AN ORGANIZED HEALTH CARE EDUCATION/TRAINING PROGRAM
Payer: MEDICARE

## 2025-05-22 ENCOUNTER — HOSPITAL ENCOUNTER (OUTPATIENT)
Age: 65
Discharge: HOME | End: 2025-05-25
Payer: MEDICARE

## 2025-05-22 DIAGNOSIS — K29.50 CHRONIC GASTRITIS WITHOUT BLEEDING, UNSPECIFIED GASTRITIS TYPE: ICD-10-CM

## 2025-05-22 DIAGNOSIS — R10.9 ABDOMINAL PAIN, UNSPECIFIED ABDOMINAL LOCATION: ICD-10-CM

## 2025-05-22 DIAGNOSIS — R63.4 UNINTENTIONAL WEIGHT LOSS: ICD-10-CM

## 2025-05-22 DIAGNOSIS — D64.9 ANEMIA, UNSPECIFIED TYPE: ICD-10-CM

## 2025-05-22 LAB — CREAT SERPL-MCNC: 1.16 MG/DL (ref 0.6–1.3)

## 2025-05-22 PROCEDURE — 36415 COLL VENOUS BLD VENIPUNCTURE: CPT

## 2025-05-22 PROCEDURE — 74177 CT ABD & PELVIS W/CONTRAST: CPT

## 2025-05-22 PROCEDURE — 6360000004 HC RX CONTRAST MEDICATION: Performed by: STUDENT IN AN ORGANIZED HEALTH CARE EDUCATION/TRAINING PROGRAM

## 2025-05-22 PROCEDURE — 82565 ASSAY OF CREATININE: CPT

## 2025-05-22 PROCEDURE — 2500000003 HC RX 250 WO HCPCS: Performed by: STUDENT IN AN ORGANIZED HEALTH CARE EDUCATION/TRAINING PROGRAM

## 2025-05-22 RX ORDER — IOPAMIDOL 755 MG/ML
100 INJECTION, SOLUTION INTRAVASCULAR
Status: COMPLETED | OUTPATIENT
Start: 2025-05-22 | End: 2025-05-22

## 2025-05-22 RX ADMIN — BARIUM SULFATE 450 ML: 20 SUSPENSION ORAL at 11:00

## 2025-05-22 RX ADMIN — IOPAMIDOL 100 ML: 755 INJECTION, SOLUTION INTRAVENOUS at 12:50

## 2025-05-28 ENCOUNTER — RESULTS FOLLOW-UP (OUTPATIENT)
Facility: CLINIC | Age: 65
End: 2025-05-28

## 2025-05-28 DIAGNOSIS — E55.9 VITAMIN D DEFICIENCY: Primary | ICD-10-CM

## 2025-06-19 ENCOUNTER — OFFICE VISIT (OUTPATIENT)
Facility: CLINIC | Age: 65
End: 2025-06-19
Payer: MEDICARE

## 2025-06-19 VITALS
RESPIRATION RATE: 18 BRPM | TEMPERATURE: 97.8 F | BODY MASS INDEX: 22.69 KG/M2 | OXYGEN SATURATION: 99 % | HEIGHT: 66 IN | HEART RATE: 74 BPM | WEIGHT: 141.2 LBS | SYSTOLIC BLOOD PRESSURE: 113 MMHG | DIASTOLIC BLOOD PRESSURE: 67 MMHG

## 2025-06-19 DIAGNOSIS — E11.9 TYPE 2 DIABETES MELLITUS WITHOUT COMPLICATION, WITHOUT LONG-TERM CURRENT USE OF INSULIN (HCC): ICD-10-CM

## 2025-06-19 DIAGNOSIS — R63.4 UNINTENTIONAL WEIGHT LOSS: Primary | ICD-10-CM

## 2025-06-19 PROCEDURE — 99214 OFFICE O/P EST MOD 30 MIN: CPT | Performed by: STUDENT IN AN ORGANIZED HEALTH CARE EDUCATION/TRAINING PROGRAM

## 2025-06-19 PROCEDURE — 3017F COLORECTAL CA SCREEN DOC REV: CPT | Performed by: STUDENT IN AN ORGANIZED HEALTH CARE EDUCATION/TRAINING PROGRAM

## 2025-06-19 PROCEDURE — 1123F ACP DISCUSS/DSCN MKR DOCD: CPT | Performed by: STUDENT IN AN ORGANIZED HEALTH CARE EDUCATION/TRAINING PROGRAM

## 2025-06-19 PROCEDURE — G8420 CALC BMI NORM PARAMETERS: HCPCS | Performed by: STUDENT IN AN ORGANIZED HEALTH CARE EDUCATION/TRAINING PROGRAM

## 2025-06-19 PROCEDURE — G8428 CUR MEDS NOT DOCUMENT: HCPCS | Performed by: STUDENT IN AN ORGANIZED HEALTH CARE EDUCATION/TRAINING PROGRAM

## 2025-06-19 PROCEDURE — 2022F DILAT RTA XM EVC RTNOPTHY: CPT | Performed by: STUDENT IN AN ORGANIZED HEALTH CARE EDUCATION/TRAINING PROGRAM

## 2025-06-19 PROCEDURE — 3046F HEMOGLOBIN A1C LEVEL >9.0%: CPT | Performed by: STUDENT IN AN ORGANIZED HEALTH CARE EDUCATION/TRAINING PROGRAM

## 2025-06-19 PROCEDURE — 3044F HG A1C LEVEL LT 7.0%: CPT | Performed by: STUDENT IN AN ORGANIZED HEALTH CARE EDUCATION/TRAINING PROGRAM

## 2025-06-19 PROCEDURE — 1036F TOBACCO NON-USER: CPT | Performed by: STUDENT IN AN ORGANIZED HEALTH CARE EDUCATION/TRAINING PROGRAM

## 2025-06-19 NOTE — PROGRESS NOTES
Chronic Disease Follow up    Ashley Molina (: 1960) is a 65 y.o. male here for evaluation of the following chief concerns(s):  Follow-up (1m follow up )       ASSESSMENT/PLAN:  1. Unintentional weight loss  2. Type 2 diabetes mellitus without complication, without long-term current use of insulin (HCC)    No orders of the defined types were placed in this encounter.    Weight is stable   Discussed options- monitoring vs heme-onc referral. Patients sister feels more comfortable monitoring. Will have lab work done on Monday.   I offered 3 month follow up with a colleague to monitor weight while I am on maternity leave. They prefer to wait 6 months until I get back. Sister is going to monitor the weight at home.   Continue Metformin 500mg daily for diabetes- if A1c is still <5.6 at follow up will DC completely.     Return in 6 months (on 2025) for MWV.    Patient agrees with plan as above and has no additional questions at this time.     SUBJECTIVE/OBJECTIVE:    Patient presents for follow up weight.   He is here with his sister   His weight has been stable. Appetite good. No new symptoms   He had CT C/A/P done since I last saw him. Reviewed together. No abnormal findings, nothing to explain his weight loss.   Unfortunately the lab daniela his creatinine for the CT scan but did not draw any of my other lab orders.   He has had EGD and colonoscopy which did not reveal any cause for his weight loss   Currently taking Metformin 500mg daily. Last A1c 5.5- got up to 11.1 back in .   Sister said that she found extensive black mold in the house- wondering if this is contributing?  Back pain has improved significantly with the weight loss- hardly ever having to use his muscle relaxer      Vitals:    25 1236   BP: 113/67   Pulse: 74   Resp: 18   Temp: 97.8 °F (36.6 °C)   TempSrc: Temporal   SpO2: 99%   Weight: 64 kg (141 lb 3.2 oz)   Height: 1.676 m (5' 6\")      Body mass index is 22.79 kg/m².     Gen: NAD,

## 2025-06-19 NOTE — PROGRESS NOTES
Ashley Molina presents today for   Chief Complaint   Patient presents with    Follow-up     1m follow up        Is someone accompanying this pt? yes    Is the patient using any DME equipment during OV? no    Health Maintenance Due   Topic Date Due    Diabetic foot exam  Never done    Diabetic Alb to Cr ratio (uACR) test  Never done    Diabetic retinal exam  Never done    Pneumococcal 50+ years Vaccine (1 of 2 - PCV) Never done    Shingles vaccine (1 of 2) Never done    COVID-19 Vaccine (3 - 2024-25 season) 09/01/2024    Annual Wellness Visit (Medicare Advantage)  01/01/2025    Lipids  05/22/2025         \"Have you been to the ER, urgent care clinic since your last visit?  Hospitalized since your last visit?\"    NO    “Have you seen or consulted any other health care providers outside our system since your last visit?”    NO

## 2025-06-20 ENCOUNTER — HOSPITAL ENCOUNTER (OUTPATIENT)
Age: 65
Discharge: HOME | End: 2025-06-23
Payer: MEDICARE

## 2025-06-20 DIAGNOSIS — D64.9 ANEMIA, UNSPECIFIED TYPE: ICD-10-CM

## 2025-06-20 DIAGNOSIS — E83.52 HYPERCALCEMIA: ICD-10-CM

## 2025-06-20 DIAGNOSIS — R63.4 UNINTENTIONAL WEIGHT LOSS: ICD-10-CM

## 2025-06-20 DIAGNOSIS — E11.65 TYPE 2 DIABETES MELLITUS WITH HYPERGLYCEMIA, WITHOUT LONG-TERM CURRENT USE OF INSULIN (HCC): ICD-10-CM

## 2025-06-20 LAB
25(OH)D3 SERPL-MCNC: 15.6 NG/ML (ref 30–100)
ALBUMIN SERPL-MCNC: 3.5 G/DL (ref 3.4–5)
ALBUMIN/GLOB SERPL: 1.1
ALP SERPL-CCNC: 62 U/L (ref 45–117)
ALT SERPL-CCNC: 62 U/L (ref 10–50)
ANION GAP SERPL CALC-SCNC: 10 MMOL/L
APPEARANCE UR: CLEAR
AST SERPL W P-5'-P-CCNC: 37 U/L (ref 10–38)
BACTERIA URNS QL MICRO: ABNORMAL /HPF
BASOPHILS # BLD: 0.06 K/UL (ref 0–0.1)
BASOPHILS NFR BLD: 1.4 % (ref 0–2)
BILIRUB SERPL-MCNC: 0.5 MG/DL (ref 0.2–1)
BILIRUB UR QL: NEGATIVE
BUN SERPL-MCNC: 20 MG/DL (ref 6–23)
BUN/CREAT SERPL: 19
CA-I BLD-MCNC: 9.8 MG/DL (ref 8.5–10.1)
CA-I BLD-MCNC: 9.9 MG/DL (ref 8.5–10.1)
CHLORIDE SERPL-SCNC: 110 MMOL/L (ref 98–107)
CHOLEST SERPL-MCNC: 88 MG/DL
CO2 SERPL-SCNC: 22 MMOL/L (ref 21–32)
COLOR UR: YELLOW
CREAT SERPL-MCNC: 1.05 MG/DL (ref 0.6–1.3)
CREAT UR-MCNC: 140 MG/DL (ref 30–125)
DIFFERENTIAL METHOD BLD: ABNORMAL
EOSINOPHIL # BLD: 0.35 K/UL (ref 0–0.4)
EOSINOPHIL NFR BLD: 8.2 % (ref 0–5)
EPITH CASTS URNS QL MICRO: ABNORMAL /LPF (ref 0–20)
ERYTHROCYTE [DISTWIDTH] IN BLOOD BY AUTOMATED COUNT: 13.3 % (ref 11.6–14.5)
ERYTHROCYTE [SEDIMENTATION RATE] IN BLOOD: 6 MM/HR (ref 0–20)
FERRITIN SERPL-MCNC: 51 NG/ML (ref 13–400)
GLOBULIN SER CALC-MCNC: 3.2 G/DL
GLUCOSE SERPL-MCNC: 161 MG/DL (ref 74–108)
GLUCOSE UR STRIP.AUTO-MCNC: NEGATIVE MG/DL
HCT VFR BLD AUTO: 34.7 % (ref 36–48)
HDLC SERPL-MCNC: 46 MG/DL (ref 40–60)
HDLC SERPL: 1.9 (ref 0–5)
HGB BLD-MCNC: 11 G/DL (ref 13–16)
HGB UR QL STRIP: NEGATIVE
IMM GRANULOCYTES # BLD AUTO: 0 K/UL (ref 0–0.04)
IMM GRANULOCYTES NFR BLD AUTO: 0 % (ref 0–0.5)
IRON SATN MFR SERPL: 20 %
IRON SERPL-MCNC: 55 UG/DL (ref 50–175)
KETONES UR QL STRIP.AUTO: NEGATIVE MG/DL
LDLC SERPL CALC-MCNC: 34 MG/DL (ref 0–100)
LEUKOCYTE ESTERASE UR QL STRIP.AUTO: NEGATIVE
LYMPHOCYTES # BLD: 1.34 K/UL (ref 0.9–3.6)
LYMPHOCYTES NFR BLD: 31.2 % (ref 21–52)
MCH RBC QN AUTO: 27.4 PG (ref 24–34)
MCHC RBC AUTO-ENTMCNC: 31.7 G/DL (ref 31–37)
MCV RBC AUTO: 86.3 FL (ref 78–100)
MICROALBUMIN UR-MCNC: <1.2 MG/DL (ref 0–3)
MICROALBUMIN/CREAT UR-RTO: ABNORMAL MGMALB/GCRE (ref 0–30)
MONOCYTES # BLD: 0.59 K/UL (ref 0.05–1.2)
MONOCYTES NFR BLD: 13.8 % (ref 3–10)
NEUTS SEG # BLD: 1.95 K/UL (ref 1.8–8)
NEUTS SEG NFR BLD: 45.4 % (ref 40–73)
NITRITE UR QL STRIP.AUTO: NEGATIVE
NRBC # BLD: 0 K/UL (ref 0–0.01)
NRBC BLD-RTO: 0 PER 100 WBC
PH UR STRIP: 6.5 (ref 5–8)
PLATELET # BLD AUTO: 167 K/UL (ref 135–420)
PMV BLD AUTO: 10.4 FL (ref 9.2–11.8)
POTASSIUM SERPL-SCNC: 4.5 MMOL/L (ref 3.5–5.5)
PROT SERPL-MCNC: 6.7 G/DL (ref 6.4–8.2)
PROT UR STRIP-MCNC: NEGATIVE MG/DL
PTH-INTACT SERPL-MCNC: 102 PG/ML (ref 15–65)
RBC # BLD AUTO: 4.02 M/UL (ref 4.35–5.65)
RBC #/AREA URNS HPF: ABNORMAL /HPF (ref 0–2)
SODIUM SERPL-SCNC: 142 MMOL/L (ref 136–145)
SP GR UR REFRACTOMETRY: 1.02 (ref 1–1.03)
TIBC SERPL-MCNC: 276 UG/DL (ref 250–450)
TRIGL SERPL-MCNC: 41 MG/DL (ref 0–150)
UIBC SERPL-MCNC: 221 UG/DL (ref 112–347)
UROBILINOGEN UR QL STRIP.AUTO: 1 EU/DL (ref 0.2–1)
VLDLC SERPL CALC-MCNC: 8 MG/DL
WBC # BLD AUTO: 4.3 K/UL (ref 4.6–13.2)
WBC URNS QL MICRO: ABNORMAL /HPF (ref 0–4)

## 2025-06-20 PROCEDURE — 82728 ASSAY OF FERRITIN: CPT

## 2025-06-20 PROCEDURE — 83550 IRON BINDING TEST: CPT

## 2025-06-20 PROCEDURE — 85025 COMPLETE CBC W/AUTO DIFF WBC: CPT

## 2025-06-20 PROCEDURE — 81001 URINALYSIS AUTO W/SCOPE: CPT

## 2025-06-20 PROCEDURE — 36415 COLL VENOUS BLD VENIPUNCTURE: CPT

## 2025-06-20 PROCEDURE — 80061 LIPID PANEL: CPT

## 2025-06-20 PROCEDURE — 83540 ASSAY OF IRON: CPT

## 2025-06-20 PROCEDURE — 82570 ASSAY OF URINE CREATININE: CPT

## 2025-06-20 PROCEDURE — 80053 COMPREHEN METABOLIC PANEL: CPT

## 2025-06-20 PROCEDURE — 82043 UR ALBUMIN QUANTITATIVE: CPT

## 2025-06-20 PROCEDURE — 83970 ASSAY OF PARATHORMONE: CPT

## 2025-06-20 PROCEDURE — 82306 VITAMIN D 25 HYDROXY: CPT

## 2025-06-20 PROCEDURE — 82330 ASSAY OF CALCIUM: CPT

## 2025-06-20 PROCEDURE — 84443 ASSAY THYROID STIM HORMONE: CPT

## 2025-06-20 PROCEDURE — 85651 RBC SED RATE NONAUTOMATED: CPT

## 2025-06-21 LAB — TSH SERPL DL<=0.05 MIU/L-ACNC: 2.51 UIU/ML (ref 0.45–4.5)

## 2025-06-22 LAB — CA-I SERPL ISE-MCNC: 5.3 MG/DL (ref 4.5–5.6)

## 2025-06-23 RX ORDER — ERGOCALCIFEROL 1.25 MG/1
50000 CAPSULE, LIQUID FILLED ORAL WEEKLY
Qty: 12 CAPSULE | Refills: 0 | Status: SHIPPED | OUTPATIENT
Start: 2025-06-23

## 2025-07-08 RX ORDER — TIZANIDINE 2 MG/1
TABLET ORAL
Qty: 30 TABLET | Refills: 0 | Status: SHIPPED | OUTPATIENT
Start: 2025-07-08

## 2025-07-10 NOTE — TELEPHONE ENCOUNTER
Called patient to speak about message from Elissa Hawley MD. Unable to reach patient via telephone. Unable to leave voicemail. Will attempt to call again.   Voicemail box full.

## 2025-07-10 NOTE — TELEPHONE ENCOUNTER
----- Message from Dr. Elissa Hawley MD sent at 6/23/2025  1:05 PM EDT -----  Do you mind calling patients sister?  Labs look stable other than vitamin D level is very low...   I am going to send 12 weeks of vitamin D repletion for him- it is once weekly- when they are finished this prescription they can continue maintenance vitmain D 1000 Units over the counter. WE will plan to recheck levels at his follow up.     Thanks!    Elissa

## 2025-08-04 RX ORDER — TIZANIDINE 2 MG/1
TABLET ORAL
Qty: 30 TABLET | Refills: 0 | Status: SHIPPED | OUTPATIENT
Start: 2025-08-04

## 2025-08-26 RX ORDER — TIZANIDINE 2 MG/1
TABLET ORAL
Qty: 30 TABLET | Refills: 0 | Status: SHIPPED | OUTPATIENT
Start: 2025-08-26

## 2025-09-02 RX ORDER — TIZANIDINE 2 MG/1
TABLET ORAL
Qty: 90 TABLET | Refills: 1 | Status: SHIPPED | OUTPATIENT
Start: 2025-09-02

## 2025-09-02 RX ORDER — ATORVASTATIN CALCIUM 20 MG/1
20 TABLET, FILM COATED ORAL NIGHTLY
Qty: 90 TABLET | Refills: 1 | Status: SHIPPED | OUTPATIENT
Start: 2025-09-02

## (undated) DEVICE — KIT COLON W/ 1.1OZ LUB AND 2 END

## (undated) DEVICE — CONMED SCOPE SAVER BITE BLOCK, 20X27 MM: Brand: SCOPE SAVER

## (undated) DEVICE — TUBING IRRIGATION BK FLO VLV FOR OFP ENDOSTAT ENDOGATOR DISP

## (undated) DEVICE — TUBING INSUFFLATION CAP W/ EXT CARBON DIOX ENDO SMARTCAP

## (undated) DEVICE — Device: Brand: DEFENDO VALVE AND CONNECTOR KIT

## (undated) DEVICE — TRAP SURG QUAD PARABOLA SLOT DSGN SFTY SCRN TRAPEASE

## (undated) DEVICE — SNARE ENDOSCP L240CM DIA2.8MM LOOP W20MM RND INSUL STIFF

## (undated) DEVICE — FORCEPS BX L240CM JAW DIA2.4MM ORNG L CAP W/ NDL DISP RAD

## (undated) DEVICE — SOLUTION IRRIG 500ML STRL H2O NONPYROGENIC

## (undated) DEVICE — SOLUTION IRRIG 1000ML STRL H2O USP PLAS POUR BTL

## (undated) DEVICE — TUBING, SUCTION, 9/32" X 10', STRAIGHT: Brand: MEDLINE